# Patient Record
Sex: MALE | Race: WHITE | NOT HISPANIC OR LATINO | Employment: FULL TIME | URBAN - METROPOLITAN AREA
[De-identification: names, ages, dates, MRNs, and addresses within clinical notes are randomized per-mention and may not be internally consistent; named-entity substitution may affect disease eponyms.]

---

## 2019-08-21 PROBLEM — F43.12 CHRONIC POST-TRAUMATIC STRESS DISORDER (PTSD): Status: ACTIVE | Noted: 2019-01-02

## 2019-08-21 PROBLEM — F90.2 ATTENTION DEFICIT HYPERACTIVITY DISORDER (ADHD), COMBINED TYPE: Status: ACTIVE | Noted: 2019-01-02

## 2019-08-21 RX ORDER — PAROXETINE HYDROCHLORIDE 20 MG/1
TABLET, FILM COATED ORAL
COMMUNITY
Start: 2019-01-02 | End: 2019-08-23

## 2019-08-23 ENCOUNTER — OFFICE VISIT (OUTPATIENT)
Dept: PSYCHIATRY | Facility: CLINIC | Age: 34
End: 2019-08-23

## 2019-08-23 DIAGNOSIS — F43.12 CHRONIC POST-TRAUMATIC STRESS DISORDER (PTSD): ICD-10-CM

## 2019-08-23 DIAGNOSIS — F90.2 ATTENTION DEFICIT HYPERACTIVITY DISORDER (ADHD), COMBINED TYPE: Primary | ICD-10-CM

## 2019-08-23 PROCEDURE — 99212 OFFICE O/P EST SF 10 MIN: CPT | Performed by: PSYCHIATRY & NEUROLOGY
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
## 2019-08-23 NOTE — PATIENT INSTRUCTIONS
Attention Deficit Hyperactivity Disorder, Adult  Attention deficit hyperactivity disorder (ADHD) is a mental health disorder that starts during childhood. For many people with ADHD, the disorder continues into adult years. There are many things that you and your health care provider or therapist (mental health professional) can do to manage your symptoms.  What are the causes?  The exact cause of ADHD is not known.  What increases the risk?  You are more likely to develop this condition if:  · You have a family history of ADHD.  · You are male.  · You were born to a mother who smoked or drank alcohol during pregnancy.  · You were exposed to lead poisoning or other toxins in the womb or in early life.  · You were born before 37 weeks of pregnancy (prematurely) or you had a low birth weight.  · You have experienced a brain injury.  What are the signs or symptoms?  Symptoms of this condition depend on the type of ADHD. The two main types are inattentive and hyperactive-impulsive. Some people may have symptoms of both types.  Symptoms of the inattentive type include:  · Difficulty watching, listening, or thinking with focused effort (paying attention).  · Making careless mistakes.  · Not listening.  · Not following instructions.  · Being disorganized.  · Avoiding tasks that require time and attention.  · Losing things.  · Forgetting things.  · Being easily distracted.  Symptoms of the hyperactive-impulsive type include:  · Restlessness.  · Talking too much.  · Interrupting.  · Difficulty with:  ? Sitting still.  ? Staying quiet.  ? Feeling motivated.  ? Relaxing.  ? Waiting in line or waiting for a turn.  How is this diagnosed?  This condition is diagnosed based on your current symptoms and your history of symptoms. The diagnosis can be made by a provider such as a primary care provider, psychiatrist, psychologist, or clinical . The provider may use a symptom checklist or a standardized behavior rating  scale to evaluate your symptoms. He or she may want to talk with family members who have known you for a long time and have observed your behaviors.  There are no lab tests or brain imaging tests that can diagnose ADHD.  How is this treated?  This condition can be treated with medicines and behavior therapy. Medicines may be the best option to reduce impulsive behaviors and improve attention. Your health care provider may recommend:  · Stimulant medicines. These are the most common medicines used for adult ADHD. They affect certain chemicals in the brain (neurotransmitters). These medicines may be long-acting or short-acting. This will determine how often you need to take the medicine.  · A non-stimulant medicine for adult ADHD (atomoxetine). This medicine increases a neurotransmitter called norepinephrine. It may take weeks to months to see effects from this medicine.  Psychotherapy and behavioral management are also important for treating ADHD. Psychotherapy is often used along with medicine. Your health care provider may suggest:  · Cognitive behavioral therapy (CBT). This type of therapy teaches you to replace negative thoughts and actions with positive thoughts and actions. When used as part of ADHD treatment, this therapy may also include:  ? Coping strategies for organization, time management, impulse control, and stress reduction.  ? Mindfulness and meditation training.  · Behavioral management. This may include strategies for organization and time management. You may work with an ADHD  who is specially trained to help people with ADHD to manage and organize activities and to function more effectively.  Follow these instructions at home:  Medicines    · Take over-the-counter and prescription medicines only as told by your health care provider.  · Talk with your health care provider about the possible side effects of your medicine to watch for.  General instructions    · Learn as much as you can about  adult ADHD, and work closely with your health care providers to find the treatments that work best for you.  · Do not use drugs or abuse alcohol. Limit alcohol intake to no more than 1 drink a day for nonpregnant women and 2 drinks a day for men. One drink equals 12 oz of beer, 5 oz of wine, or 1½ oz of hard liquor.  · Follow the same schedule each day. Make sure your schedule includes enough time for you to get plenty of sleep.  · Use reminder devices like notes, calendars, and phone apps to stay on-time and organized.  · Eat a healthy diet. Do not skip meals.  · Exercise regularly. Exercise can help to reduce stress and anxiety.  · Keep all follow-up visits as told by your health care provider and therapist. This is important.  Where to find more information  · A health care provider may be able to recommend resources that are available online or over the phone. You could start with:  ? Attention Deficit Disorder Association (ADDA): www.add.org  ? National [place] [employer] Health (NIMH): www.nimh.nih.gov  Contact a health care provider if:  · Your symptoms are changing, getting worse, or not improving.  · You have side effects from your medicine, such as:  ? Repeated muscle twitches, coughing, or speech outbursts.  ? Sleep problems.  ? Loss of appetite.  ? Depression.  ? New or worsening behavior problems.  ? Dizziness.  ? Unusually fast heartbeat.  ? Stomach pains.  ? Headaches.  · You are struggling with anxiety, depression, or substance abuse.  Get help right away if:  · You have a severe reaction to a medicine.  · You have thoughts of hurting yourself or others.  If you ever feel like you may hurt yourself or others, or have thoughts about taking your own life, get help right away. You can go to the nearest emergency department or call:  · Your local emergency services (911 in the [place]  · A suicide crisis helpline, such as the National Suicide Prevention Lifeline at 1-547.789.2535. This is open 24 hours a  day.  Summary  · ADHD is a mental health disorder that starts during childhood and often continues into adult years.  · The exact cause of ADHD is not known.  · There is no cure for ADHD, but treatment with medicine, therapy, or behavioral training can help you manage your condition.  This information is not intended to replace advice given to you by your health care provider. Make sure you discuss any questions you have with your health care provider.  Document Released: 08/09/2018 Document Revised: 08/09/2018 Document Reviewed: 08/09/2018  [employer] Interactive Patient Education © 2019 [employer]

## 2019-09-26 DIAGNOSIS — F90.2 ATTENTION DEFICIT HYPERACTIVITY DISORDER (ADHD), COMBINED TYPE: ICD-10-CM

## 2019-10-31 DIAGNOSIS — F90.2 ATTENTION DEFICIT HYPERACTIVITY DISORDER (ADHD), COMBINED TYPE: ICD-10-CM

## 2019-11-20 ENCOUNTER — OFFICE VISIT (OUTPATIENT)
Dept: PSYCHIATRY | Facility: CLINIC | Age: 34
End: 2019-11-20

## 2019-11-20 DIAGNOSIS — F90.2 ATTENTION DEFICIT HYPERACTIVITY DISORDER (ADHD), COMBINED TYPE: Primary | ICD-10-CM

## 2019-11-20 DIAGNOSIS — F43.12 CHRONIC POST-TRAUMATIC STRESS DISORDER (PTSD): ICD-10-CM

## 2019-11-20 PROCEDURE — 99212 OFFICE O/P EST SF 10 MIN: CPT | Performed by: PSYCHIATRY & NEUROLOGY
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
## 2019-11-20 NOTE — PATIENT INSTRUCTIONS
Living With Attention Deficit Hyperactivity Disorder  If you have been diagnosed with attention deficit hyperactivity disorder (ADHD), you may be relieved that you now know why you have felt or behaved a certain way. Still, you may feel overwhelmed about the treatment ahead. You may also wonder how to get the support you need and how to deal with the condition day-to-day. With treatment and support, you can live with ADHD and manage your symptoms.  How to manage lifestyle changes  Managing stress  Stress is your body's reaction to life changes and events, both good and bad. To cope with the stress of an ADHD diagnosis, it may help to:  · Learn more about ADHD.  · Exercise regularly. Even a short daily walk can lower stress levels.  · Participate in training or education programs (including social skills training classes) that teach you to deal with symptoms.    Medicines  Your health care provider may suggest certain medicines if he or she feels that they will help to improve your condition. Stimulant medicines are usually prescribed to treat ADHD, and therapy may also be prescribed. It is important to:  · Avoid using alcohol and other substances that may prevent your medicines from working properly (may interact).   · Talk with your pharmacist or health care provider about all the medicines that you take, their possible side effects, and what medicines are safe to take together.  · Make it your goal to take part in all treatment decisions (shared decision-making). Ask about possible side effects of medicines that your health care provider recommends, and tell him or her how you feel about having those side effects. It is best if shared decision-making with your health care provider is part of your total treatment plan.  Relationships  To strengthen your relationships with family members while treating your condition, consider taking part in family therapy. You might also attend self-help groups alone or with a  loved one.  Be honest about how your symptoms affect your relationships. Make an effort to communicate respectfully instead of fighting, and find ways to show others that you care. Psychotherapy may be useful in helping you cope with how ADHD affects your relationships.  How to recognize changes in your condition  The following signs may mean that your treatment is working well and your condition is improving:  · Consistently being on time for appointments.  · Being more organized at home and work.  · Other people noticing improvements in your behavior.  · Achieving goals that you set for yourself.  · Thinking more clearly.  The following signs may mean that your treatment is not working very well:  · Feeling impatience or more confusion.  · Missing, forgetting, or being late for appointments.  · An increasing sense of disorganization and messiness.  · More difficulty in reaching goals that you set for yourself.  · Loved ones becoming angry or frustrated with you.  Where to find support  Talking to others  · Keep emotion out of important discussions and speak in a calm, logical way.  · Listen closely and patiently to your loved ones. Try to understand their point of view, and try to avoid getting defensive.  · Take responsibility for the consequences of your actions.  · Ask that others do not take your behaviors personally.  · Aim to solve problems as they come up, and express your feelings instead of bottling them up.  · Talk openly about what you need from your loved ones and how they can support you.  · Consider going to family therapy sessions or having your family meet with a specialist who deals with ADHD-related behavior problems.  Finances  Not all insurance plans cover mental health care, so it is important to check with your insurance carrier. If paying for co-pays or counseling services is a problem, search for a [facility] or [facility] mental health care center. Public mental health care services may be offered  there at a low cost or no cost when you are not able to see a private health care provider.  If you are taking medicine for ADHD, you may be able to get the generic form, which may be less expensive than brand-name medicine. Some makers of prescription medicines also offer help to patients who cannot afford the medicines that they need.  Follow these instructions at home:  · Take over-the-counter and prescription medicines only as told by your health care provider. Check with your health care provider before taking any new medicines.  · Create structure and an organized atmosphere at home. For example:  ? Make a list of tasks, then rank them from most important to least important. Work on one task at a time until your listed tasks are done.  ? Make a daily schedule and follow it consistently every day.  ? Use an appointment calendar, and check it 2 or 3 times a day to keep on track. Keep it with you when you leave the house.  ? Create spaces where you keep certain things, and always put things back in their places after you use them.  · Keep all follow-up visits as told by your health care provider. This is important.  Questions to ask your health care provider:  · What are the risks and benefits of taking medicines?  · Would I benefit from therapy?  · How often should I follow up with a health care provider?  Contact a health care provider if:  · You have side effects from your medicines, such as:  ? Repeated muscle twitches, coughing, or speech outbursts.  ? Sleep problems.  ? Loss of appetite.  ? Depression.  ? New or worsening behavior problems.  ? Dizziness.  ? Unusually fast heartbeat.  ? Stomach pains.  ? Headaches.  Get help right away if:  · You have a severe reaction to a medicine.  · Your behavior suddenly gets worse.  Summary  · With treatment and support, you can live with ADHD and manage your symptoms.  · The medicines that are most often prescribed for ADHD are stimulants.  · Consider taking part in  family therapy or self-help groups with family members or friends.  · When you talk with friends and family about your ADHD, be patient and communicate openly.  · Take over-the-counter and prescription medicines only as told by your health care provider. Check with your health care provider before taking any new medicines.  This information is not intended to replace advice given to you by your health care provider. Make sure you discuss any questions you have with your health care provider.  Document Released: 04/19/2018 Document Revised: 04/19/2018 Document Reviewed: 04/19/2018  [employer] Interactive Patient Education © 2019 [employer]

## 2020-01-07 DIAGNOSIS — F90.2 ATTENTION DEFICIT HYPERACTIVITY DISORDER (ADHD), COMBINED TYPE: ICD-10-CM

## 2020-02-04 DIAGNOSIS — F90.2 ATTENTION DEFICIT HYPERACTIVITY DISORDER (ADHD), COMBINED TYPE: ICD-10-CM

## 2020-02-20 ENCOUNTER — TELEPHONE (OUTPATIENT)
Dept: PSYCHIATRY | Facility: CLINIC | Age: 35
End: 2020-02-20

## 2020-02-20 DIAGNOSIS — F90.2 ATTENTION DEFICIT HYPERACTIVITY DISORDER (ADHD), COMBINED TYPE: ICD-10-CM

## 2020-02-20 NOTE — TELEPHONE ENCOUNTER
Patient states he went on business trip and was unable to  vyvanse script and is wondering if you could resend being that they ended up putting back script and will not fill. Please advise

## 2020-03-05 ENCOUNTER — OFFICE VISIT (OUTPATIENT)
Dept: PSYCHIATRY | Facility: CLINIC | Age: 35
End: 2020-03-05

## 2020-03-05 DIAGNOSIS — F43.12 CHRONIC POST-TRAUMATIC STRESS DISORDER (PTSD): ICD-10-CM

## 2020-03-05 DIAGNOSIS — F90.2 ATTENTION DEFICIT HYPERACTIVITY DISORDER (ADHD), COMBINED TYPE: Primary | ICD-10-CM

## 2020-03-05 PROCEDURE — 99212 OFFICE O/P EST SF 10 MIN: CPT | Performed by: PSYCHIATRY & NEUROLOGY
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
## 2020-03-05 NOTE — PATIENT INSTRUCTIONS
Attention Deficit Hyperactivity Disorder, Adult  Attention deficit hyperactivity disorder (ADHD) is a mental health disorder that starts during childhood. For many people with ADHD, the disorder continues into adult years. There are many things that you and your health care provider or therapist (mental health professional) can do to manage your symptoms.  What are the causes?  The exact cause of ADHD is not known.  What increases the risk?  You are more likely to develop this condition if:  · You have a family history of ADHD.  · You are male.  · You were born to a mother who smoked or drank alcohol during pregnancy.  · You were exposed to lead poisoning or other toxins in the womb or in early life.  · You were born before 37 weeks of pregnancy (prematurely) or you had a low birth weight.  · You have experienced a brain injury.  What are the signs or symptoms?  Symptoms of this condition depend on the type of ADHD. The two main types are inattentive and hyperactive-impulsive. Some people may have symptoms of both types.  Symptoms of the inattentive type include:  · Difficulty watching, listening, or thinking with focused effort (paying attention).  · Making careless mistakes.  · Not listening.  · Not following instructions.  · Being disorganized.  · Avoiding tasks that require time and attention.  · Losing things.  · Forgetting things.  · Being easily distracted.  Symptoms of the hyperactive-impulsive type include:  · Restlessness.  · Talking too much.  · Interrupting.  · Difficulty with:  ? Sitting still.  ? Staying quiet.  ? Feeling motivated.  ? Relaxing.  ? Waiting in line or waiting for a turn.  How is this diagnosed?  This condition is diagnosed based on your current symptoms and your history of symptoms. The diagnosis can be made by a provider such as a primary care provider, psychiatrist, psychologist, or clinical . The provider may use a symptom checklist or a standardized behavior rating  scale to evaluate your symptoms. He or she may want to talk with family members who have known you for a long time and have observed your behaviors.  There are no lab tests or brain imaging tests that can diagnose ADHD.  How is this treated?  This condition can be treated with medicines and behavior therapy. Medicines may be the best option to reduce impulsive behaviors and improve attention. Your health care provider may recommend:  · Stimulant medicines. These are the most common medicines used for adult ADHD. They affect certain chemicals in the brain (neurotransmitters). These medicines may be long-acting or short-acting. This will determine how often you need to take the medicine.  · A non-stimulant medicine for adult ADHD (atomoxetine). This medicine increases a neurotransmitter called norepinephrine. It may take weeks to months to see effects from this medicine.  Psychotherapy and behavioral management are also important for treating ADHD. Psychotherapy is often used along with medicine. Your health care provider may suggest:  · Cognitive behavioral therapy (CBT). This type of therapy teaches you to replace negative thoughts and actions with positive thoughts and actions. When used as part of ADHD treatment, this therapy may also include:  ? Coping strategies for organization, time management, impulse control, and stress reduction.  ? Mindfulness and meditation training.  · Behavioral management. This may include strategies for organization and time management. You may work with an ADHD  who is specially trained to help people with ADHD to manage and organize activities and to function more effectively.  Follow these instructions at home:  Medicines    · Take over-the-counter and prescription medicines only as told by your health care provider.  · Talk with your health care provider about the possible side effects of your medicine to watch for.  General instructions    · Learn as much as you can about  adult ADHD, and work closely with your health care providers to find the treatments that work best for you.  · Do not use drugs or abuse alcohol. Limit alcohol intake to no more than 1 drink a day for nonpregnant women and 2 drinks a day for men. One drink equals 12 oz of beer, 5 oz of wine, or 1½ oz of hard liquor.  · Follow the same schedule each day. Make sure your schedule includes enough time for you to get plenty of sleep.  · Use reminder devices like notes, calendars, and phone apps to stay on-time and organized.  · Eat a healthy diet. Do not skip meals.  · Exercise regularly. Exercise can help to reduce stress and anxiety.  · Keep all follow-up visits as told by your health care provider and therapist. This is important.  Where to find more information  · A health care provider may be able to recommend resources that are available online or over the phone. You could start with:  ? Attention Deficit Disorder Association (ADDA): www.add.org  ? National [place] of Mental Health (NIMH): www.nimh.nih.gov  Contact a health care provider if:  · Your symptoms are changing, getting worse, or not improving.  · You have side effects from your medicine, such as:  ? Repeated muscle twitches, coughing, or speech outbursts.  ? Sleep problems.  ? Loss of appetite.  ? Depression.  ? New or worsening behavior problems.  ? Dizziness.  ? Unusually fast heartbeat.  ? Stomach pains.  ? Headaches.  · You are struggling with anxiety, depression, or substance abuse.  Get help right away if:  · You have a severe reaction to a medicine.  · You have thoughts of hurting yourself or others.  If you ever feel like you may hurt yourself or others, or have thoughts about taking your own life, get help right away. You can go to the nearest emergency department or call:  · Your local emergency services (911 in the [place]  · A suicide crisis helpline, such as the National Suicide Prevention Lifeline at 1-125.349.1302. This is open 24 hours a  day.  Summary  · ADHD is a mental health disorder that starts during childhood and often continues into adult years.  · The exact cause of ADHD is not known.  · There is no cure for ADHD, but treatment with medicine, therapy, or behavioral training can help you manage your condition.  This information is not intended to replace advice given to you by your health care provider. Make sure you discuss any questions you have with your health care provider.  Document Released: 08/09/2018 Document Revised: 08/09/2018 Document Reviewed: 08/09/2018  [employer] Interactive Patient Education © 2020 [employer]

## 2020-04-22 DIAGNOSIS — F90.2 ATTENTION DEFICIT HYPERACTIVITY DISORDER (ADHD), COMBINED TYPE: ICD-10-CM

## 2020-04-23 NOTE — TELEPHONE ENCOUNTER
4/22/20-- PT REQUEST VYVANCE REFILL 60MG ONE A DAY-- [facility]  
4/23/20- dr stollcribed meds/lks  
Less than 6 months (influenza vaccine not applicable for this age group)

## 2020-05-26 DIAGNOSIS — F90.2 ATTENTION DEFICIT HYPERACTIVITY DISORDER (ADHD), COMBINED TYPE: ICD-10-CM

## 2020-06-23 DIAGNOSIS — F90.2 ATTENTION DEFICIT HYPERACTIVITY DISORDER (ADHD), COMBINED TYPE: ICD-10-CM

## 2020-07-10 ENCOUNTER — OFFICE VISIT (OUTPATIENT)
Dept: PSYCHIATRY | Facility: CLINIC | Age: 35
End: 2020-07-10

## 2020-07-10 DIAGNOSIS — F90.2 ATTENTION DEFICIT HYPERACTIVITY DISORDER (ADHD), COMBINED TYPE: Primary | ICD-10-CM

## 2020-07-10 DIAGNOSIS — F43.12 CHRONIC POST-TRAUMATIC STRESS DISORDER (PTSD): ICD-10-CM

## 2020-07-10 PROCEDURE — 99441 PR PHYS/QHP TELEPHONE EVALUATION 5-10 MIN: CPT | Performed by: PSYCHIATRY & NEUROLOGY

## 2020-07-10 NOTE — PATIENT INSTRUCTIONS
Attention Deficit Hyperactivity Disorder, Adult  Attention deficit hyperactivity disorder (ADHD) is a mental health disorder that starts during childhood. For many people with ADHD, the disorder continues into adult years. There are many things that you and your health care provider or therapist (mental health professional) can do to manage your symptoms.  What are the causes?  The exact cause of ADHD is not known.  What increases the risk?  You are more likely to develop this condition if:  · You have a family history of ADHD.  · You are male.  · You were born to a mother who smoked or drank alcohol during pregnancy.  · You were exposed to lead poisoning or other toxins in the womb or in early life.  · You were born before 37 weeks of pregnancy (prematurely) or you had a low birth weight.  · You have experienced a brain injury.  What are the signs or symptoms?  Symptoms of this condition depend on the type of ADHD. The two main types are inattentive and hyperactive-impulsive. Some people may have symptoms of both types.  Symptoms of the inattentive type include:  · Difficulty watching, listening, or thinking with focused effort (paying attention).  · Making careless mistakes.  · Not listening.  · Not following instructions.  · Being disorganized.  · Avoiding tasks that require time and attention.  · Losing things.  · Forgetting things.  · Being easily distracted.  Symptoms of the hyperactive-impulsive type include:  · Restlessness.  · Talking too much.  · Interrupting.  · Difficulty with:  ? Sitting still.  ? Staying quiet.  ? Feeling motivated.  ? Relaxing.  ? Waiting in line or waiting for a turn.  How is this diagnosed?  This condition is diagnosed based on your current symptoms and your history of symptoms. The diagnosis can be made by a provider such as a primary care provider, psychiatrist, psychologist, or clinical . The provider may use a symptom checklist or a standardized behavior rating  scale to evaluate your symptoms. He or she may want to talk with family members who have known you for a long time and have observed your behaviors.  There are no lab tests or brain imaging tests that can diagnose ADHD.  How is this treated?  This condition can be treated with medicines and behavior therapy. Medicines may be the best option to reduce impulsive behaviors and improve attention. Your health care provider may recommend:  · Stimulant medicines. These are the most common medicines used for adult ADHD. They affect certain chemicals in the brain (neurotransmitters). These medicines may be long-acting or short-acting. This will determine how often you need to take the medicine.  · A non-stimulant medicine for adult ADHD (atomoxetine). This medicine increases a neurotransmitter called norepinephrine. It may take weeks to months to see effects from this medicine.  Psychotherapy and behavioral management are also important for treating ADHD. Psychotherapy is often used along with medicine. Your health care provider may suggest:  · Cognitive behavioral therapy (CBT). This type of therapy teaches you to replace negative thoughts and actions with positive thoughts and actions. When used as part of ADHD treatment, this therapy may also include:  ? Coping strategies for organization, time management, impulse control, and stress reduction.  ? Mindfulness and meditation training.  · Behavioral management. This may include strategies for organization and time management. You may work with an ADHD  who is specially trained to help people with ADHD to manage and organize activities and to function more effectively.  Follow these instructions at home:  Medicines    · Take over-the-counter and prescription medicines only as told by your health care provider.  · Talk with your health care provider about the possible side effects of your medicine to watch for.  General instructions    · Learn as much as you can about  adult ADHD, and work closely with your health care providers to find the treatments that work best for you.  · Do not use drugs or abuse alcohol. Limit alcohol intake to no more than 1 drink a day for nonpregnant women and 2 drinks a day for men. One drink equals 12 oz of beer, 5 oz of wine, or 1½ oz of hard liquor.  · Follow the same schedule each day. Make sure your schedule includes enough time for you to get plenty of sleep.  · Use reminder devices like notes, calendars, and phone apps to stay on-time and organized.  · Eat a healthy diet. Do not skip meals.  · Exercise regularly. Exercise can help to reduce stress and anxiety.  · Keep all follow-up visits as told by your health care provider and therapist. This is important.  Where to find more information  · A health care provider may be able to recommend resources that are available online or over the phone. You could start with:  ? Attention Deficit Disorder Association (ADDA): www.add.org  ? [facility] [place] [facility] (NIMH): www.nimh.nih.gov  Contact a health care provider if:  · Your symptoms are changing, getting worse, or not improving.  · You have side effects from your medicine, such as:  ? Repeated muscle twitches, coughing, or speech outbursts.  ? Sleep problems.  ? Loss of appetite.  ? Depression.  ? New or worsening behavior problems.  ? Dizziness.  ? Unusually fast heartbeat.  ? Stomach pains.  ? Headaches.  · You are struggling with anxiety, depression, or substance abuse.  Get help right away if:  · You have a severe reaction to a medicine.  · You have thoughts of hurting yourself or others.  If you ever feel like you may hurt yourself or others, or have thoughts about taking your own life, get help right away. You can go to the nearest emergency department or call:  · Your local emergency services (911 in the [place]  · A suicide crisis helpline, such as the National Suicide Prevention Lifeline at 1-181.612.9320. This is open 24 hours a  day.  Summary  · ADHD is a mental health disorder that starts during childhood and often continues into adult years.  · The exact cause of ADHD is not known.  · There is no cure for ADHD, but treatment with medicine, therapy, or behavioral training can help you manage your condition.  This information is not intended to replace advice given to you by your health care provider. Make sure you discuss any questions you have with your health care provider.  Document Released: 08/09/2018 Document Revised: 11/30/2018 Document Reviewed: 08/09/2018  Elsevier Patient Education © 2020 [employer]
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
## 2020-07-20 DIAGNOSIS — F90.2 ATTENTION DEFICIT HYPERACTIVITY DISORDER (ADHD), COMBINED TYPE: ICD-10-CM

## 2020-08-28 DIAGNOSIS — F90.2 ATTENTION DEFICIT HYPERACTIVITY DISORDER (ADHD), COMBINED TYPE: ICD-10-CM

## 2020-09-29 DIAGNOSIS — F90.2 ATTENTION DEFICIT HYPERACTIVITY DISORDER (ADHD), COMBINED TYPE: ICD-10-CM

## 2020-11-05 ENCOUNTER — OFFICE VISIT (OUTPATIENT)
Dept: PSYCHIATRY | Facility: CLINIC | Age: 35
End: 2020-11-05

## 2020-11-05 DIAGNOSIS — F43.12 CHRONIC POST-TRAUMATIC STRESS DISORDER (PTSD): ICD-10-CM

## 2020-11-05 DIAGNOSIS — F90.2 ATTENTION DEFICIT HYPERACTIVITY DISORDER (ADHD), COMBINED TYPE: Primary | ICD-10-CM

## 2020-11-05 PROCEDURE — 99441 PR PHYS/QHP TELEPHONE EVALUATION 5-10 MIN: CPT | Performed by: PSYCHIATRY & NEUROLOGY
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
## 2020-11-05 NOTE — PATIENT INSTRUCTIONS
Attention Deficit Hyperactivity Disorder, Adult  Attention deficit hyperactivity disorder (ADHD) is a mental health disorder that starts during childhood (neurodevelopmental disorder). For many people with ADHD, the disorder continues into the adult years. Treatment can help you manage your symptoms.  What are the causes?  The exact cause of ADHD is not known. Most experts believe genetics and environmental factors contribute to ADHD.  What increases the risk?  The following factors may make you more likely to develop this condition:  · Having a family history of ADHD.  · Being male.  · Being born to a mother who smoked or drank alcohol during pregnancy.  · Being exposed to lead or other toxins in the womb or early in life.  · Being born before 37 weeks of pregnancy (prematurely) or at a low birth weight.  · Having experienced a brain injury.  What are the signs or symptoms?  Symptoms of this condition depend on the type of ADHD. The two main types are inattentive and hyperactive-impulsive. Some people may have symptoms of both types.  Symptoms of the inattentive type include:  · Difficulty paying attention.  · Making careless mistakes.  · Not following instructions.  · Being disorganized.  · Avoiding tasks that require time and attention.  · Losing and forgetting things.  · Being easily distracted.  Symptoms of the hyperactive-impulsive type include:  · Restlessness.  · Talking too much.  · Interrupting.  · Difficulty with:  ? Sitting still.  ? Feeling motivated.  ? Relaxing.  ? Waiting in line or waiting for a turn.  In adults, this condition may lead to certain problems, such as:  · Keeping jobs.  · Performing tasks at work.  · Having stable relationships.  · Being on time or keeping to a schedule.  How is this diagnosed?  This condition is diagnosed based on your current symptoms and your history of symptoms. The diagnosis can be made by a health care provider such as a primary care provider or a mental health  care specialist.  Your health care provider may use a symptom checklist or a behavior rating scale to evaluate your symptoms. He or she may also want to talk with people who have observed your behaviors throughout your life.  How is this treated?  This condition can be treated with medicines and behavior therapy. Medicines may be the best option to reduce impulsive behaviors and improve attention. Your health care provider may recommend:  · Stimulant medicines. These are the most common medicines used for adult ADHD. They affect certain chemicals in the brain (neurotransmitters) and improve your ability to control your symptoms.  · A non-stimulant medicine for adult ADHD (atomoxetine). This medicine increases a neurotransmitter called norepinephrine. It may take weeks to months to see effects from this medicine.  Counseling and behavioral management are also important for treating ADHD. Counseling is often used along with medicine. Your health care provider may suggest:  · Cognitive behavioral therapy (CBT). This type of therapy teaches you to replace negative thoughts and actions with positive thoughts and actions. When used as part of ADHD treatment, this therapy may also include:  ? Coping strategies for organization, time management, impulse control, and stress reduction.  ? Mindfulness and meditation training.  · Behavioral management. You may work with a  who is specially trained to help people with ADHD manage and organize activities and function more effectively.  Follow these instructions at home:  Medicines    · Take over-the-counter and prescription medicines only as told by your health care provider.  · Talk with your health care provider about the possible side effects of your medicines and how to manage them.  Lifestyle    · Do not use drugs.  · Do not drink alcohol if:  ? Your health care provider tells you not to drink.  ? You are pregnant, may be pregnant, or are planning to become  pregnant.  · If you drink alcohol:  ? Limit how much you use to:  § 0-1 drink a day for women.  § 0-2 drinks a day for men.  ? Be aware of how much alcohol is in your drink. In the [place] one drink equals one 12 oz bottle of beer (355 mL), one 5 oz glass of wine (148 mL), or one 1½ oz glass of hard liquor (44 mL).  · Get enough sleep.  · Eat a healthy diet.  · Exercise regularly. Exercise can help to reduce stress and anxiety.  General instructions  · Learn as much as you can about adult ADHD, and work closely with your health care providers to find the treatments that work best for you.  · Follow the same schedule each day.  · Use reminder devices like notes, calendars, and phone apps to stay on time and organized.  · Keep all follow-up visits as told by your health care provider and therapist. This is important.  Where to find more information  A health care provider may be able to recommend resources that are available online or over the phone. You could start with:  · Attention Deficit Disorder Association (ADDA): www.add.org  · [place] of Mental Health (NIM): www.nimh.nih.gov  Contact a health care provider if:  · Your symptoms continue to cause problems.  · You have side effects from your medicine, such as:  ? Repeated muscle twitches, coughing, or speech outbursts.  ? Sleep problems.  ? Loss of appetite.  ? Dizziness.  ? Unusually fast heartbeat.  ? Stomach pains.  ? Headaches.  · You are struggling with anxiety, depression, or substance abuse.  Get help right away if you:  · Have a severe reaction to a medicine.  If you ever feel like you may hurt yourself or others, or have thoughts about taking your own life, get help right away. You can go to the nearest emergency department or call:  · Your local emergency services (911 in the [place]  · A suicide crisis helpline, such as the National Suicide Prevention Lifeline at 1-467.345.1996. This is open 24 hours a day.  Summary  · ADHD is a mental health  disorder that starts during childhood (neurodevelopmental disorder) and often continues into the adult years.  · The exact cause of ADHD is not known. Most experts believe genetics and environmental factors contribute to ADHD.  · There is no cure for ADHD, but treatment with medicine, cognitive behavioral therapy, or behavioral management can help you manage your condition.  This information is not intended to replace advice given to you by your health care provider. Make sure you discuss any questions you have with your health care provider.  Document Released: 08/09/2018 Document Revised: 05/11/2020 Document Reviewed: 05/11/2020  Elsevier Patient Education © 2020 [employer]

## 2020-12-11 DIAGNOSIS — F90.2 ATTENTION DEFICIT HYPERACTIVITY DISORDER (ADHD), COMBINED TYPE: ICD-10-CM

## 2021-01-12 DIAGNOSIS — F90.2 ATTENTION DEFICIT HYPERACTIVITY DISORDER (ADHD), COMBINED TYPE: ICD-10-CM

## 2021-02-10 DIAGNOSIS — F90.2 ATTENTION DEFICIT HYPERACTIVITY DISORDER (ADHD), COMBINED TYPE: ICD-10-CM

## 2021-02-24 ENCOUNTER — OFFICE VISIT (OUTPATIENT)
Dept: PSYCHIATRY | Facility: CLINIC | Age: 36
End: 2021-02-24

## 2021-02-24 DIAGNOSIS — F43.12 CHRONIC POST-TRAUMATIC STRESS DISORDER (PTSD): Chronic | ICD-10-CM

## 2021-02-24 DIAGNOSIS — Z79.899 ENCOUNTER FOR LONG-TERM (CURRENT) USE OF OTHER MEDICATIONS: ICD-10-CM

## 2021-02-24 DIAGNOSIS — F90.2 ATTENTION DEFICIT HYPERACTIVITY DISORDER (ADHD), COMBINED TYPE: Primary | Chronic | ICD-10-CM

## 2021-02-24 PROCEDURE — 99214 OFFICE O/P EST MOD 30 MIN: CPT | Performed by: PSYCHIATRY & NEUROLOGY

## 2021-02-24 NOTE — PATIENT INSTRUCTIONS
Attention Deficit Hyperactivity Disorder, Adult  Attention deficit hyperactivity disorder (ADHD) is a mental health disorder that starts during childhood (neurodevelopmental disorder). For many people with ADHD, the disorder continues into the adult years. Treatment can help you manage your symptoms.  What are the causes?  The exact cause of ADHD is not known. Most experts believe genetics and environmental factors contribute to ADHD.  What increases the risk?  The following factors may make you more likely to develop this condition:  · Having a family history of ADHD.  · Being male.  · Being born to a mother who smoked or drank alcohol during pregnancy.  · Being exposed to lead or other toxins in the womb or early in life.  · Being born before 37 weeks of pregnancy (prematurely) or at a low birth weight.  · Having experienced a brain injury.  What are the signs or symptoms?  Symptoms of this condition depend on the type of ADHD. The two main types are inattentive and hyperactive-impulsive. Some people may have symptoms of both types.  Symptoms of the inattentive type include:  · Difficulty paying attention.  · Making careless mistakes.  · Not following instructions.  · Being disorganized.  · Avoiding tasks that require time and attention.  · Losing and forgetting things.  · Being easily distracted.  Symptoms of the hyperactive-impulsive type include:  · Restlessness.  · Talking too much.  · Interrupting.  · Difficulty with:  ? Sitting still.  ? Feeling motivated.  ? Relaxing.  ? Waiting in line or waiting for a turn.  In adults, this condition may lead to certain problems, such as:  · Keeping jobs.  · Performing tasks at work.  · Having stable relationships.  · Being on time or keeping to a schedule.  How is this diagnosed?  This condition is diagnosed based on your current symptoms and your history of symptoms. The diagnosis can be made by a health care provider such as a primary care provider or a mental health  care specialist.  Your health care provider may use a symptom checklist or a behavior rating scale to evaluate your symptoms. He or she may also want to talk with people who have observed your behaviors throughout your life.  How is this treated?  This condition can be treated with medicines and behavior therapy. Medicines may be the best option to reduce impulsive behaviors and improve attention. Your health care provider may recommend:  · Stimulant medicines. These are the most common medicines used for adult ADHD. They affect certain chemicals in the brain (neurotransmitters) and improve your ability to control your symptoms.  · A non-stimulant medicine for adult ADHD (atomoxetine). This medicine increases a neurotransmitter called norepinephrine. It may take weeks to months to see effects from this medicine.  Counseling and behavioral management are also important for treating ADHD. Counseling is often used along with medicine. Your health care provider may suggest:  · Cognitive behavioral therapy (CBT). This type of therapy teaches you to replace negative thoughts and actions with positive thoughts and actions. When used as part of ADHD treatment, this therapy may also include:  ? Coping strategies for organization, time management, impulse control, and stress reduction.  ? Mindfulness and meditation training.  · Behavioral management. You may work with a  who is specially trained to help people with ADHD manage and organize activities and function more effectively.  Follow these instructions at home:  Medicines    · Take over-the-counter and prescription medicines only as told by your health care provider.  · Talk with your health care provider about the possible side effects of your medicines and how to manage them.  Lifestyle    · Do not use drugs.  · Do not drink alcohol if:  ? Your health care provider tells you not to drink.  ? You are pregnant, may be pregnant, or are planning to become  pregnant.  · If you drink alcohol:  ? Limit how much you use to:  § 0-1 drink a day for women.  § 0-2 drinks a day for men.  ? Be aware of how much alcohol is in your drink. In the [place] one drink equals one 12 oz bottle of beer (355 mL), one 5 oz glass of wine (148 mL), or one 1½ oz glass of hard liquor (44 mL).  · Get enough sleep.  · Eat a healthy diet.  · Exercise regularly. Exercise can help to reduce stress and anxiety.  General instructions  · Learn as much as you can about adult ADHD, and work closely with your health care providers to find the treatments that work best for you.  · Follow the same schedule each day.  · Use reminder devices like notes, calendars, and phone apps to stay on time and organized.  · Keep all follow-up visits as told by your health care provider and therapist. This is important.  Where to find more information  A health care provider may be able to recommend resources that are available online or over the phone. You could start with:  · Attention Deficit Disorder Association (ADDA): www.add.org  · National [place] of Mental Health (NIM): www.nimh.nih.gov  Contact a health care provider if:  · Your symptoms continue to cause problems.  · You have side effects from your medicine, such as:  ? Repeated muscle twitches, coughing, or speech outbursts.  ? Sleep problems.  ? Loss of appetite.  ? Dizziness.  ? Unusually fast heartbeat.  ? Stomach pains.  ? Headaches.  · You are struggling with anxiety, depression, or substance abuse.  Get help right away if you:  · Have a severe reaction to a medicine.  If you ever feel like you may hurt yourself or others, or have thoughts about taking your own life, get help right away. You can go to the nearest emergency department or call:  · Your local emergency services (911 in the [place]  · A suicide crisis helpline, such as the National Suicide Prevention Lifeline at 1-196.397.5458. This is open 24 hours a day.  Summary  · ADHD is a mental health  disorder that starts during childhood (neurodevelopmental disorder) and often continues into the adult years.  · The exact cause of ADHD is not known. Most experts believe genetics and environmental factors contribute to ADHD.  · There is no cure for ADHD, but treatment with medicine, cognitive behavioral therapy, or behavioral management can help you manage your condition.  This information is not intended to replace advice given to you by your health care provider. Make sure you discuss any questions you have with your health care provider.  Document Revised: 05/11/2020 Document Reviewed: 05/11/2020  Elsevier Patient Education © 2020 [employer] Inc.
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
## 2021-03-17 DIAGNOSIS — F90.2 ATTENTION DEFICIT HYPERACTIVITY DISORDER (ADHD), COMBINED TYPE: ICD-10-CM

## 2021-04-13 DIAGNOSIS — F90.2 ATTENTION DEFICIT HYPERACTIVITY DISORDER (ADHD), COMBINED TYPE: ICD-10-CM

## 2021-04-16 ENCOUNTER — BULK ORDERING (OUTPATIENT)
Dept: CASE MANAGEMENT | Facility: OTHER | Age: 36
End: 2021-04-16

## 2021-04-16 DIAGNOSIS — Z23 IMMUNIZATION DUE: ICD-10-CM

## 2021-05-18 DIAGNOSIS — F90.2 ATTENTION DEFICIT HYPERACTIVITY DISORDER (ADHD), COMBINED TYPE: ICD-10-CM

## 2021-06-16 DIAGNOSIS — F90.2 ATTENTION DEFICIT HYPERACTIVITY DISORDER (ADHD), COMBINED TYPE: ICD-10-CM

## 2021-07-13 ENCOUNTER — TELEPHONE (OUTPATIENT)
Dept: PSYCHIATRY | Facility: CLINIC | Age: 36
End: 2021-07-13

## 2021-07-13 NOTE — TELEPHONE ENCOUNTER
Pt would like to do a video visit or phone visit if possible. He has started a new job and cannot make it in person today

## 2021-07-22 DIAGNOSIS — F90.2 ATTENTION DEFICIT HYPERACTIVITY DISORDER (ADHD), COMBINED TYPE: ICD-10-CM

## 2021-08-19 DIAGNOSIS — F90.2 ATTENTION DEFICIT HYPERACTIVITY DISORDER (ADHD), COMBINED TYPE: ICD-10-CM

## 2021-09-20 DIAGNOSIS — F90.2 ATTENTION DEFICIT HYPERACTIVITY DISORDER (ADHD), COMBINED TYPE: ICD-10-CM

## 2021-09-21 ENCOUNTER — OFFICE VISIT (OUTPATIENT)
Dept: PSYCHIATRY | Facility: CLINIC | Age: 36
End: 2021-09-21

## 2021-09-21 DIAGNOSIS — F43.12 CHRONIC POST-TRAUMATIC STRESS DISORDER (PTSD): Chronic | ICD-10-CM

## 2021-09-21 DIAGNOSIS — Z79.899 ENCOUNTER FOR LONG-TERM (CURRENT) USE OF OTHER MEDICATIONS: ICD-10-CM

## 2021-09-21 DIAGNOSIS — F90.2 ATTENTION DEFICIT HYPERACTIVITY DISORDER (ADHD), COMBINED TYPE: Primary | Chronic | ICD-10-CM

## 2021-09-21 PROCEDURE — 99214 OFFICE O/P EST MOD 30 MIN: CPT | Performed by: PSYCHIATRY & NEUROLOGY

## 2021-09-21 RX ORDER — DEXTROAMPHETAMINE SULFATE 10 MG/1
TABLET ORAL
Qty: 30 TABLET | Refills: 0 | Status: SHIPPED | OUTPATIENT
Start: 2021-09-21 | End: 2022-01-21 | Stop reason: SINTOL

## 2021-09-21 NOTE — PATIENT INSTRUCTIONS
Attention Deficit Hyperactivity Disorder, Adult  Attention deficit hyperactivity disorder (ADHD) is a mental health disorder that starts during childhood (neurodevelopmental disorder). For many people with ADHD, the disorder continues into the adult years. Treatment can help you manage your symptoms.  What are the causes?  The exact cause of ADHD is not known. Most experts believe genetics and environmental factors contribute to ADHD.  What increases the risk?  The following factors may make you more likely to develop this condition:  · Having a family history of ADHD.  · Being male.  · Being born to a mother who smoked or drank alcohol during pregnancy.  · Being exposed to lead or other toxins in the womb or early in life.  · Being born before 37 weeks of pregnancy (prematurely) or at a low birth weight.  · Having experienced a brain injury.  What are the signs or symptoms?  Symptoms of this condition depend on the type of ADHD. The two main types are inattentive and hyperactive-impulsive. Some people may have symptoms of both types.  Symptoms of the inattentive type include:  · Difficulty paying attention.  · Making careless mistakes.  · Not following instructions.  · Being disorganized.  · Avoiding tasks that require time and attention.  · Losing and forgetting things.  · Being easily distracted.  Symptoms of the hyperactive-impulsive type include:  · Restlessness.  · Talking too much.  · Interrupting.  · Difficulty with:  ? Sitting still.  ? Feeling motivated.  ? Relaxing.  ? Waiting in line or waiting for a turn.  In adults, this condition may lead to certain problems, such as:  · Keeping jobs.  · Performing tasks at work.  · Having stable relationships.  · Being on time or keeping to a schedule.  How is this diagnosed?  This condition is diagnosed based on your current symptoms and your history of symptoms. The diagnosis can be made by a health care provider such as a primary care provider or a mental health  care specialist.  Your health care provider may use a symptom checklist or a behavior rating scale to evaluate your symptoms. He or she may also want to talk with people who have observed your behaviors throughout your life.  How is this treated?  This condition can be treated with medicines and behavior therapy. Medicines may be the best option to reduce impulsive behaviors and improve attention. Your health care provider may recommend:  · Stimulant medicines. These are the most common medicines used for adult ADHD. They affect certain chemicals in the brain (neurotransmitters) and improve your ability to control your symptoms.  · A non-stimulant medicine for adult ADHD (atomoxetine). This medicine increases a neurotransmitter called norepinephrine. It may take weeks to months to see effects from this medicine.  Counseling and behavioral management are also important for treating ADHD. Counseling is often used along with medicine. Your health care provider may suggest:  · Cognitive behavioral therapy (CBT). This type of therapy teaches you to replace negative thoughts and actions with positive thoughts and actions. When used as part of ADHD treatment, this therapy may also include:  ? Coping strategies for organization, time management, impulse control, and stress reduction.  ? Mindfulness and meditation training.  · Behavioral management. You may work with a  who is specially trained to help people with ADHD manage and organize activities and function more effectively.  Follow these instructions at home:  Medicines    · Take over-the-counter and prescription medicines only as told by your health care provider.  · Talk with your health care provider about the possible side effects of your medicines and how to manage them.    Lifestyle    · Do not use drugs.  · Do not drink alcohol if:  ? Your health care provider tells you not to drink.  ? You are pregnant, may be pregnant, or are planning to become  pregnant.  · If you drink alcohol:  ? Limit how much you use to:  § 0-1 drink a day for women.  § 0-2 drinks a day for men.  ? Be aware of how much alcohol is in your drink. In the [place] one drink equals one 12 oz bottle of beer (355 mL), one 5 oz glass of wine (148 mL), or one 1½ oz glass of hard liquor (44 mL).  · Get enough sleep.  · Eat a healthy diet.  · Exercise regularly. Exercise can help to reduce stress and anxiety.    General instructions  · Learn as much as you can about adult ADHD, and work closely with your health care providers to find the treatments that work best for you.  · Follow the same schedule each day.  · Use reminder devices like notes, calendars, and phone apps to stay on time and organized.  · Keep all follow-up visits as told by your health care provider and therapist. This is important.  Where to find more information  A health care provider may be able to recommend resources that are available online or over the phone. You could start with:  · Attention Deficit Disorder Association (ADDA): www.add.org  · National [place] of Mental Health (NIM): www.nimh.nih.gov  Contact a health care provider if:  · Your symptoms continue to cause problems.  · You have side effects from your medicine, such as:  ? Repeated muscle twitches, coughing, or speech outbursts.  ? Sleep problems.  ? Loss of appetite.  ? Dizziness.  ? Unusually fast heartbeat.  ? Stomach pains.  ? Headaches.  · You are struggling with anxiety, depression, or substance abuse.  Get help right away if you:  · Have a severe reaction to a medicine.  If you ever feel like you may hurt yourself or others, or have thoughts about taking your own life, get help right away. You can go to the nearest emergency department or call:  · Your local emergency services (911 in the [place]  · A suicide crisis helpline, such as the National Suicide Prevention Lifeline at 1-781.656.7166. This is open 24 hours a day.  Summary  · ADHD is a mental  health disorder that starts during childhood (neurodevelopmental disorder) and often continues into the adult years.  · The exact cause of ADHD is not known. Most experts believe genetics and environmental factors contribute to ADHD.  · There is no cure for ADHD, but treatment with medicine, cognitive behavioral therapy, or behavioral management can help you manage your condition.  This information is not intended to replace advice given to you by your health care provider. Make sure you discuss any questions you have with your health care provider.  Document Revised: 05/11/2020 Document Reviewed: 05/11/2020  [employer] Patient Education © 2021 [employer]
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
## 2021-10-19 DIAGNOSIS — F90.2 ATTENTION DEFICIT HYPERACTIVITY DISORDER (ADHD), COMBINED TYPE: Chronic | ICD-10-CM

## 2021-10-19 NOTE — TELEPHONE ENCOUNTER
Pt wanted you to know that he took dextroamphetamines for 3 weeks, but it didn't help with anything, so he quit taking it.  He is open to any other suggestions you have.  He says Vyvanse is working well.

## 2021-10-19 NOTE — TELEPHONE ENCOUNTER
Rx Refill Note  Requested Prescriptions     Pending Prescriptions Disp Refills   • lisdexamfetamine (Vyvanse) 60 MG capsule 30 capsule 0     Sig: Take 1 capsule by mouth Every Morning      Last office visit with prescribing clinician: 9/21/2021      Next office visit with prescribing clinician: 1/21/2022   Office Visit with Estrellita Lieberman MD (09/21/2021)       SCANNED - LABS (09/21/2021)      Steph Aguilar MA  10/19/21, 14:52 EDT     No pt found on inspect, but     Spenser needed.

## 2021-11-18 DIAGNOSIS — F90.2 ATTENTION DEFICIT HYPERACTIVITY DISORDER (ADHD), COMBINED TYPE: Chronic | ICD-10-CM

## 2021-11-18 NOTE — TELEPHONE ENCOUNTER
Rx Refill Note  Requested Prescriptions     Pending Prescriptions Disp Refills   • lisdexamfetamine (Vyvanse) 60 MG capsule 30 capsule 0     Sig: Take 1 capsule by mouth Every Morning      Last office visit with prescribing clinician: 9/21/2021      Next office visit with prescribing clinician: 1/21/2022   Office Visit with Estrellita Lieberman MD (09/21/2021)       SCANNED - LABS (09/21/2021)      Kiki Castro MA  11/18/21, 13:49 EST   NO INSPECT needs Sp[facility]er

## 2021-12-16 DIAGNOSIS — F90.2 ATTENTION DEFICIT HYPERACTIVITY DISORDER (ADHD), COMBINED TYPE: Chronic | ICD-10-CM

## 2021-12-16 NOTE — TELEPHONE ENCOUNTER
Incoming Refill Request      Medication requested (name and dose): Vyvanse) 60 MG capsule    Pharmacy where request should be sent: [facility]    Additional details provided by patient: none    Best call back number: [phone]    Does the patient have less than a 3 day supply:  [] Yes  [x] No    Sofie Godfrey  12/16/21, 16:08 EST

## 2022-01-17 DIAGNOSIS — F90.2 ATTENTION DEFICIT HYPERACTIVITY DISORDER (ADHD), COMBINED TYPE: Chronic | ICD-10-CM

## 2022-01-17 NOTE — TELEPHONE ENCOUNTER
Rx Refill Note  Requested Prescriptions      No prescriptions requested or ordered in this encounter      Last office visit with prescribing clinician: 9/21/2021      Next office visit with prescribing clinician: 1/21/2022   Office Visit with Estrellita Lieberman MD (09/21/2021)       SCANNED - LABS (09/21/2021)      Kiki Castro MA  01/17/22, 11:14 EST     NO inspect- needs Spenser

## 2022-01-21 ENCOUNTER — OFFICE VISIT (OUTPATIENT)
Dept: PSYCHIATRY | Facility: CLINIC | Age: 37
End: 2022-01-21

## 2022-01-21 DIAGNOSIS — F90.2 ATTENTION DEFICIT HYPERACTIVITY DISORDER (ADHD), COMBINED TYPE: Primary | Chronic | ICD-10-CM

## 2022-01-21 DIAGNOSIS — F43.12 CHRONIC POST-TRAUMATIC STRESS DISORDER (PTSD): Chronic | ICD-10-CM

## 2022-01-21 PROCEDURE — 99214 OFFICE O/P EST MOD 30 MIN: CPT | Performed by: PSYCHIATRY & NEUROLOGY
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
## 2022-01-21 NOTE — PATIENT INSTRUCTIONS
Living With Attention Deficit Hyperactivity Disorder  If you have been diagnosed with attention deficit hyperactivity disorder (ADHD), you may be relieved that you now know why you have felt or behaved a certain way. Still, you may feel overwhelmed about the treatment ahead. You may also wonder how to get the support you need and how to deal with the condition day-to-day. With treatment and support, you can live with ADHD and manage your symptoms.  How to manage lifestyle changes  Managing stress  Stress is your body's reaction to life changes and events, both good and bad. To cope with the stress of an ADHD diagnosis, it may help to:  · Learn more about ADHD.  · Exercise regularly. Even a short daily walk can lower stress levels.  · Participate in training or education programs (including social skills training classes) that teach you to deal with symptoms.    Medicines  Your health care provider may suggest certain medicines if he or she feels that they will help to improve your condition. Stimulant medicines are usually prescribed to treat ADHD, and therapy may also be prescribed. It is important to:  · Avoid using alcohol and other substances that may prevent your medicines from working properly (may interact).  · Talk with your pharmacist or health care provider about all the medicines that you take, their possible side effects, and what medicines are safe to take together.  · Make it your goal to take part in all treatment decisions (shared decision-making). Ask about possible side effects of medicines that your health care provider recommends, and tell him or her how you feel about having those side effects. It is best if shared decision-making with your health care provider is part of your total treatment plan.  Relationships  To strengthen your relationships with family members while treating your condition, consider taking part in family therapy. You might also attend self-help groups alone or with a loved  one.  Be honest about how your symptoms affect your relationships. Make an effort to communicate respectfully instead of fighting, and find ways to show others that you care. Psychotherapy may be useful in helping you cope with how ADHD affects your relationships.  How to recognize changes in your condition  The following signs may mean that your treatment is working well and your condition is improving:  · Consistently being on time for appointments.  · Being more organized at home and work.  · Other people noticing improvements in your behavior.  · Achieving goals that you set for yourself.  · Thinking more clearly.  The following signs may mean that your treatment is not working very well:  · Feeling impatience or more confusion.  · Missing, forgetting, or being late for appointments.  · An increasing sense of disorganization and messiness.  · More difficulty in reaching goals that you set for yourself.  · Loved ones becoming angry or frustrated with you.  Follow these instructions at home:  · Take over-the-counter and prescription medicines only as told by your health care provider. Check with your health care provider before taking any new medicines.  · Create structure and an organized atmosphere at home. For example:  ? Make a list of tasks, then rank them from most important to least important. Work on one task at a time until your listed tasks are done.  ? Make a daily schedule and follow it consistently every day.  ? Use an appointment calendar, and check it 2 or 3 times a day to keep on track. Keep it with you when you leave the house.  ? Create spaces where you keep certain things, and always put things back in their places after you use them.  · Keep all follow-up visits as told by your health care provider. This is important.  Where to find support  Talking to others    · Keep emotion out of important discussions and speak in a calm, logical way.  · Listen closely and patiently to your loved ones. Try  to understand their point of view, and try to avoid getting defensive.  · Take responsibility for the consequences of your actions.  · Ask that others do not take your behaviors personally.  · Aim to solve problems as they come up, and express your feelings instead of bottling them up.  · Talk openly about what you need from your loved ones and how they can support you.  · Consider going to family therapy sessions or having your family meet with a specialist who deals with ADHD-related behavior problems.    Finances  Not all insurance plans cover mental health care, so it is important to check with your insurance carrier. If paying for co-pays or counseling services is a problem, search for a [facility] or [facility] mental health care center. Public mental health care services may be offered there at a low cost or no cost when you are not able to see a private health care provider.  If you are taking medicine for ADHD, you may be able to get the generic form, which may be less expensive than brand-name medicine. Some makers of prescription medicines also offer help to patients who cannot afford the medicines that they need.  Questions to ask your health care provider:  · What are the risks and benefits of taking medicines?  · Would I benefit from therapy?  · How often should I follow up with a health care provider?  Contact a health care provider if:  · You have side effects from your medicines, such as:  ? Repeated muscle twitches, coughing, or speech outbursts.  ? Sleep problems.  ? Loss of appetite.  ? Depression.  ? New or worsening behavior problems.  ? Dizziness.  ? Unusually fast heartbeat.  ? Stomach pains.  ? Headaches.  Get help right away if:  · You have a severe reaction to a medicine.  · Your behavior suddenly gets worse.  Summary  · With treatment and support, you can live with ADHD and manage your symptoms.  · The medicines that are most often prescribed for ADHD are stimulants.  · Consider taking part in  family therapy or self-help groups with family members or friends.  · When you talk with friends and family about your ADHD, be patient and communicate openly.  · Take over-the-counter and prescription medicines only as told by your health care provider. Check with your health care provider before taking any new medicines.  This information is not intended to replace advice given to you by your health care provider. Make sure you discuss any questions you have with your health care provider.  Document Revised: 06/02/2021 Document Reviewed: 06/02/2021  Elsevier Patient Education © 2021 [employer]

## 2022-02-18 DIAGNOSIS — F90.2 ATTENTION DEFICIT HYPERACTIVITY DISORDER (ADHD), COMBINED TYPE: Chronic | ICD-10-CM

## 2022-02-18 NOTE — TELEPHONE ENCOUNTER
Rx Refill Note  Requested Prescriptions     Pending Prescriptions Disp Refills   • lisdexamfetamine (Vyvanse) 70 MG capsule 30 capsule 0     Sig: Take 1 capsule by mouth Every Morning      Last office visit with prescribing clinician: 1/21/2022      Next office visit with prescribing clinician: 5/27/2022   Office Visit with Estrellita Lieberman MD (01/21/2022)       SCANNED - LABS (09/21/2021)      Steph Aguilar MA  02/18/22, 11:56 EST     No info on Inspect; [place] needed.

## 2022-03-01 ENCOUNTER — OFFICE VISIT (OUTPATIENT)
Dept: FAMILY MEDICINE CLINIC | Facility: CLINIC | Age: 37
End: 2022-03-01

## 2022-03-01 VITALS
HEART RATE: 94 BPM | RESPIRATION RATE: 20 BRPM | WEIGHT: 216.7 LBS | DIASTOLIC BLOOD PRESSURE: 80 MMHG | HEIGHT: 70 IN | BODY MASS INDEX: 31.02 KG/M2 | OXYGEN SATURATION: 98 % | SYSTOLIC BLOOD PRESSURE: 140 MMHG

## 2022-03-01 DIAGNOSIS — H91.90 HEARING DIFFICULTY, UNSPECIFIED LATERALITY: ICD-10-CM

## 2022-03-01 DIAGNOSIS — E73.9 LACTOSE INTOLERANCE: ICD-10-CM

## 2022-03-01 DIAGNOSIS — K21.9 GASTROESOPHAGEAL REFLUX DISEASE WITHOUT ESOPHAGITIS: Primary | ICD-10-CM

## 2022-03-01 DIAGNOSIS — Z79.899 HIGH RISK MEDICATION USE: ICD-10-CM

## 2022-03-01 DIAGNOSIS — R10.13 DYSPEPSIA: ICD-10-CM

## 2022-03-01 DIAGNOSIS — Z00.00 HEALTH MAINTENANCE EXAMINATION: ICD-10-CM

## 2022-03-01 DIAGNOSIS — H93.19 TINNITUS, UNSPECIFIED LATERALITY: ICD-10-CM

## 2022-03-01 DIAGNOSIS — F90.2 ATTENTION DEFICIT HYPERACTIVITY DISORDER (ADHD), COMBINED TYPE: ICD-10-CM

## 2022-03-01 PROCEDURE — 99204 OFFICE O/P NEW MOD 45 MIN: CPT | Performed by: NURSE PRACTITIONER

## 2022-03-01 RX ORDER — OMEPRAZOLE 20 MG/1
20 CAPSULE, DELAYED RELEASE ORAL 2 TIMES DAILY
Qty: 180 CAPSULE | Refills: 3 | Status: SHIPPED | OUTPATIENT
Start: 2022-03-01

## 2022-03-01 RX ORDER — OMEPRAZOLE 20 MG/1
20 CAPSULE, DELAYED RELEASE ORAL 2 TIMES DAILY
COMMUNITY
End: 2022-03-01
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
## 2022-03-01 NOTE — PATIENT INSTRUCTIONS
Discharge instructions continue present plan omeprazole 40 mg daily  Dr. Melgar gastro to discuss the other dyspepsia and food issues that she may have for further evaluation  If you get an EGD schedule a follow-up appointment to connect the diets and make sure good communication works well for you

## 2022-03-16 DIAGNOSIS — F90.2 ATTENTION DEFICIT HYPERACTIVITY DISORDER (ADHD), COMBINED TYPE: Chronic | ICD-10-CM

## 2022-04-14 DIAGNOSIS — F90.2 ATTENTION DEFICIT HYPERACTIVITY DISORDER (ADHD), COMBINED TYPE: Chronic | ICD-10-CM

## 2022-04-14 NOTE — TELEPHONE ENCOUNTER
Rx Refill Note  Requested Prescriptions     Pending Prescriptions Disp Refills   • lisdexamfetamine (Vyvanse) 70 MG capsule 30 capsule 0     Sig: Take 1 capsule by mouth Every Morning      Last office visit with prescribing clinician: 1/21/2022      Next office visit with prescribing clinician: 5/27/2022   Office Visit with Estrellita Lieberman MD (01/21/2022)       SCANNED - LABS (09/21/2021)      Steph Aguilar MA  04/14/22, 09:52 EDT     No info on Inspect; [place] needed.

## 2022-05-12 DIAGNOSIS — F90.2 ATTENTION DEFICIT HYPERACTIVITY DISORDER (ADHD), COMBINED TYPE: Chronic | ICD-10-CM

## 2022-05-27 ENCOUNTER — OFFICE VISIT (OUTPATIENT)
Dept: PSYCHIATRY | Facility: CLINIC | Age: 37
End: 2022-05-27

## 2022-05-27 DIAGNOSIS — F90.2 ATTENTION DEFICIT HYPERACTIVITY DISORDER (ADHD), COMBINED TYPE: Primary | Chronic | ICD-10-CM

## 2022-05-27 DIAGNOSIS — F43.12 CHRONIC POST-TRAUMATIC STRESS DISORDER (PTSD): Chronic | ICD-10-CM

## 2022-05-27 PROCEDURE — 99213 OFFICE O/P EST LOW 20 MIN: CPT | Performed by: PSYCHIATRY & NEUROLOGY
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
## 2022-07-05 DIAGNOSIS — F90.2 ATTENTION DEFICIT HYPERACTIVITY DISORDER (ADHD), COMBINED TYPE: Chronic | ICD-10-CM

## 2022-07-05 NOTE — TELEPHONE ENCOUNTER
Rx Refill Note  Requested Prescriptions     Pending Prescriptions Disp Refills   • lisdexamfetamine (Vyvanse) 70 MG capsule 30 capsule 0     Sig: Take 1 capsule by mouth Every Morning      Last office visit with prescribing clinician: 5/27/2022      Next office visit with prescribing clinician: 9/30/2022   Office Visit with Estrellita Lieberman MD (05/27/2022)       SCANNED - LABS (09/21/2021)      Steph Aguilar MA  07/05/22, 14:09 EDT     Needs thanh; no info on inspect

## 2022-08-02 DIAGNOSIS — F90.2 ATTENTION DEFICIT HYPERACTIVITY DISORDER (ADHD), COMBINED TYPE: Chronic | ICD-10-CM

## 2022-08-02 NOTE — TELEPHONE ENCOUNTER
Rx Refill Note  Requested Prescriptions     Pending Prescriptions Disp Refills   • lisdexamfetamine (Vyvanse) 70 MG capsule 30 capsule 0     Sig: Take 1 capsule by mouth Every Morning      Last office visit with prescribing clinician: 5/27/2022      Next office visit with prescribing clinician: 9/30/2022   Office Visit with Estrellita Lieberman MD (05/27/2022)       SCANNED - LABS (09/21/2021)      Steph Aguilar MA  08/02/22, 13:40 EDT     thanh needed; no info on Inspect

## 2022-09-06 DIAGNOSIS — F90.2 ATTENTION DEFICIT HYPERACTIVITY DISORDER (ADHD), COMBINED TYPE: Chronic | ICD-10-CM

## 2022-09-06 NOTE — TELEPHONE ENCOUNTER
Rx Refill Note  Requested Prescriptions     Pending Prescriptions Disp Refills   • lisdexamfetamine (Vyvanse) 70 MG capsule 30 capsule 0     Sig: Take 1 capsule by mouth Every Morning      Last office visit with prescribing clinician: 5/27/2022      Next office visit with prescribing clinician: 9/30/2022   Office Visit with Estrellita Lieberman MD (05/27/2022)       SCANNED - LABS (09/21/2021)      Steph Aguilar MA  09/06/22, 08:59 EDT     No info on Inspect; thanh needed

## 2022-09-30 ENCOUNTER — OFFICE VISIT (OUTPATIENT)
Dept: PSYCHIATRY | Facility: CLINIC | Age: 37
End: 2022-09-30

## 2022-09-30 DIAGNOSIS — F43.12 CHRONIC POST-TRAUMATIC STRESS DISORDER (PTSD): Chronic | ICD-10-CM

## 2022-09-30 DIAGNOSIS — Z79.899 ENCOUNTER FOR LONG-TERM (CURRENT) USE OF OTHER MEDICATIONS: ICD-10-CM

## 2022-09-30 DIAGNOSIS — F90.2 ATTENTION DEFICIT HYPERACTIVITY DISORDER (ADHD), COMBINED TYPE: Primary | Chronic | ICD-10-CM

## 2022-09-30 PROCEDURE — 99214 OFFICE O/P EST MOD 30 MIN: CPT | Performed by: PSYCHIATRY & NEUROLOGY
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
## 2022-11-07 DIAGNOSIS — F90.2 ATTENTION DEFICIT HYPERACTIVITY DISORDER (ADHD), COMBINED TYPE: Chronic | ICD-10-CM

## 2022-11-07 NOTE — TELEPHONE ENCOUNTER
Rx Refill Note  Requested Prescriptions     Pending Prescriptions Disp Refills   • lisdexamfetamine (Vyvanse) 70 MG capsule 30 capsule 0     Sig: Take 1 capsule by mouth Every Morning      Last office visit with prescribing clinician: 9/30/2022      Next office visit with prescribing clinician: 2/3/2023   Office Visit with Estrellita Lieberman MD (09/30/2022)       SCANNED - LABS (09/30/2022)      Steph Aguilar MA  11/07/22, 08:21 EST     Inspect printed; last fill 10-04

## 2022-12-09 DIAGNOSIS — F90.2 ATTENTION DEFICIT HYPERACTIVITY DISORDER (ADHD), COMBINED TYPE: Chronic | ICD-10-CM

## 2022-12-09 NOTE — TELEPHONE ENCOUNTER
Rx Refill Note  Requested Prescriptions     Pending Prescriptions Disp Refills   • lisdexamfetamine (Vyvanse) 70 MG capsule 30 capsule 0     Sig: Take 1 capsule by mouth Every Morning      Last office visit with prescribing clinician: 9/30/2022   Last telemedicine visit with prescribing clinician: 2/3/2023   Next office visit with prescribing clinician: 2/3/2023   Office Visit with Estrellita Lieberman MD (09/30/2022)       SCANNED - LABS (09/30/2022)                   Would you like a call back once the refill request has been completed: [] Yes [] No    If the office needs to give you a call back, can they leave a voicemail: [] Yes [] No    Steph Aguilar MA  12/09/22, 08:56 EST     Inspect printed; last fill 11/09

## 2023-01-05 DIAGNOSIS — F90.2 ATTENTION DEFICIT HYPERACTIVITY DISORDER (ADHD), COMBINED TYPE: Chronic | ICD-10-CM

## 2023-01-05 NOTE — TELEPHONE ENCOUNTER
Rx Refill Note  Requested Prescriptions     Pending Prescriptions Disp Refills   • lisdexamfetamine (Vyvanse) 70 MG capsule 30 capsule 0     Sig: Take 1 capsule by mouth Every Morning      Last office visit with prescribing clinician: 9/30/2022   Last telemedicine visit with prescribing clinician: 2/3/2023   Next office visit with prescribing clinician: 2/3/2023   Office Visit with Estrellita Lieberman MD (09/30/2022)       SCANNED - LABS (09/30/2022)                   Would you like a call back once the refill request has been completed: [] Yes [] No    If the office needs to give you a call back, can they leave a voicemail: [] Yes [] No    Steph Aguilar MA  01/05/23, 10:58 EST     Unable to get pharmacist on phone; Last fill 12-09

## 2023-02-03 ENCOUNTER — OFFICE VISIT (OUTPATIENT)
Dept: PSYCHIATRY | Facility: CLINIC | Age: 38
End: 2023-02-03
Payer: COMMERCIAL

## 2023-02-03 DIAGNOSIS — F90.2 ATTENTION DEFICIT HYPERACTIVITY DISORDER (ADHD), COMBINED TYPE: Primary | Chronic | ICD-10-CM

## 2023-02-03 DIAGNOSIS — F43.12 CHRONIC POST-TRAUMATIC STRESS DISORDER (PTSD): Chronic | ICD-10-CM

## 2023-02-03 PROCEDURE — 99214 OFFICE O/P EST MOD 30 MIN: CPT | Performed by: PSYCHIATRY & NEUROLOGY
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
## 2023-03-01 DIAGNOSIS — F90.2 ATTENTION DEFICIT HYPERACTIVITY DISORDER (ADHD), COMBINED TYPE: Chronic | ICD-10-CM

## 2023-03-01 NOTE — TELEPHONE ENCOUNTER
Rx Refill Note  Requested Prescriptions     Pending Prescriptions Disp Refills   • lisdexamfetamine (Vyvanse) 70 MG capsule 30 capsule 0     Sig: Take 1 capsule by mouth Every Morning      Last office visit with prescribing clinician: 2/3/2023   Last telemedicine visit with prescribing clinician: 6/2/2023   Next office visit with prescribing clinician: 6/2/2023   Office Visit with Estrellita Lieberman MD (02/03/2023)      SCANNED - LABS (09/30/2022)                    Would you like a call back once the refill request has been completed: [] Yes [] No    If the office needs to give you a call back, can they leave a voicemail: [] Yes [] No    Steph Aguilar MA  03/01/23, 08:55 EST     In stock every month at [facility]; Inspect printed; last fill 2-03

## 2023-03-31 DIAGNOSIS — F90.2 ATTENTION DEFICIT HYPERACTIVITY DISORDER (ADHD), COMBINED TYPE: Chronic | ICD-10-CM

## 2023-03-31 NOTE — TELEPHONE ENCOUNTER
Rx Refill Note  Requested Prescriptions      No prescriptions requested or ordered in this encounter      Last office visit with prescribing clinician: 2/3/2023   Next office visit with prescribing clinician: 6/2/2023     Progress Notes by Esrtellita Lieberman MD (02/03/2023 08:15)    SCANNED - LABS (09/30/2022)    Inspect - 3-3-2023      Sylvie Wilson MA  03/31/23, 10:37 EDT

## 2023-04-28 DIAGNOSIS — F90.2 ATTENTION DEFICIT HYPERACTIVITY DISORDER (ADHD), COMBINED TYPE: Chronic | ICD-10-CM

## 2023-04-28 NOTE — TELEPHONE ENCOUNTER
Rx Refill Note  Requested Prescriptions     Pending Prescriptions Disp Refills   • lisdexamfetamine (Vyvanse) 70 MG capsule 30 capsule 0     Sig: Take 1 capsule by mouth Every Morning      Last office visit with prescribing clinician: 2/3/2023   Last telemedicine visit with prescribing clinician: 6/2/2023   Next office visit with prescribing clinician: 6/2/2023   Office Visit with Estrellita Lieberman MD (02/03/2023)       SCANNED - LABS (09/30/2022)                   Would you like a call back once the refill request has been completed: [] Yes [] No    If the office needs to give you a call back, can they leave a voicemail: [] Yes [] No    Steph Aguilar MA  04/28/23, 09:08 EDT     Inspect printed; last fill 3-31

## 2023-05-25 DIAGNOSIS — F90.2 ATTENTION DEFICIT HYPERACTIVITY DISORDER (ADHD), COMBINED TYPE: Chronic | ICD-10-CM

## 2023-05-25 NOTE — TELEPHONE ENCOUNTER
Rx Refill Note  Requested Prescriptions     Pending Prescriptions Disp Refills   • lisdexamfetamine (Vyvanse) 70 MG capsule 30 capsule 0     Sig: Take 1 capsule by mouth Every Morning      Last office visit with prescribing clinician: 2/3/2023   Last telemedicine visit with prescribing clinician: Visit date not found   Next office visit with prescribing clinician: 6/2/2023   Office Visit with Estrellita Lieberman MD (02/03/2023)  SCANNED - LABS (09/30/2022)                        Would you like a call back once the refill request has been completed: [] Yes [] No    If the office needs to give you a call back, can they leave a voicemail: [] Yes [] No    Steph Aguilar MA  05/25/23, 09:37 EDT     Inspect printed; last fill 4-28

## 2023-06-02 ENCOUNTER — OFFICE VISIT (OUTPATIENT)
Dept: PSYCHIATRY | Facility: CLINIC | Age: 38
End: 2023-06-02

## 2023-06-02 DIAGNOSIS — F43.12 CHRONIC POST-TRAUMATIC STRESS DISORDER (PTSD): Chronic | ICD-10-CM

## 2023-06-02 DIAGNOSIS — F90.2 ATTENTION DEFICIT HYPERACTIVITY DISORDER (ADHD), COMBINED TYPE: Primary | Chronic | ICD-10-CM
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
## 2023-07-24 DIAGNOSIS — F90.2 ATTENTION DEFICIT HYPERACTIVITY DISORDER (ADHD), COMBINED TYPE: Chronic | ICD-10-CM

## 2023-07-24 NOTE — TELEPHONE ENCOUNTER
Rx Refill Note  Requested Prescriptions     Pending Prescriptions Disp Refills    lisdexamfetamine (Vyvanse) 70 MG capsule 30 capsule 0     Sig: Take 1 capsule by mouth Every Morning      Last office visit with prescribing clinician: 6/2/2023   Last telemedicine visit with prescribing clinician: Visit date not found   Next office visit with prescribing clinician: 9/29/2023   Office Visit with Estrellita Lieberman MD (06/02/2023)   SCANNED - LABS (09/30/2022)                     Would you like a call back once the refill request has been completed: [] Yes [] No    If the office needs to give you a call back, can they leave a voicemail: [] Yes [] No    Steph Aguilar MA  07/24/23, 10:09 EDT    Inspect printed; last fill 6-27-23; pt says he will wait for a shipment if it is not in stock at [place], [place]

## 2023-07-26 ENCOUNTER — TELEPHONE (OUTPATIENT)
Dept: PSYCHIATRY | Facility: CLINIC | Age: 38
End: 2023-07-26
Payer: COMMERCIAL

## 2023-07-26 DIAGNOSIS — F90.2 ATTENTION DEFICIT HYPERACTIVITY DISORDER (ADHD), COMBINED TYPE: Primary | ICD-10-CM

## 2023-07-26 NOTE — TELEPHONE ENCOUNTER
Pt says his pharmacy does not have his Vyvanse 70 mg, so he found out that [facility] in [facility] has 30 mg and 40 mg in stock.  He is asking if you could send this instead.  I told him his insurance might not cover it that way.  Please advise.

## 2023-08-25 DIAGNOSIS — F90.2 ATTENTION DEFICIT HYPERACTIVITY DISORDER (ADHD), COMBINED TYPE: ICD-10-CM

## 2023-08-25 NOTE — TELEPHONE ENCOUNTER
Rx Refill Note  Requested Prescriptions     Pending Prescriptions Disp Refills    lisdexamfetamine (Vyvanse) 30 MG capsule 30 capsule 0     Sig: Take 1 capsule by mouth Every Morning    lisdexamfetamine (Vyvanse) 40 MG capsule 30 capsule 0     Sig: Take 1 capsule by mouth Every Morning      Last office visit with prescribing clinician: 6/2/2023   Last telemedicine visit with prescribing clinician: Visit date not found   Next office visit with prescribing clinician: 9/29/2023   Office Visit with Estrellita Lieberman MD (06/02/2023)   SCANNED - LABS (09/30/2022)                     Would you like a call back once the refill request has been completed: [] Yes [] No    If the office needs to give you a call back, can they leave a voicemail: [] Yes [] No    Steph Aguilar MA  08/25/23, 15:44 EDT    Pt says he will wait for vyvanse 40 and 30 mg at [facility] in [place], [place]; last fill 7-26-23

## 2023-09-11 ENCOUNTER — NURSE TRIAGE (OUTPATIENT)
Dept: CALL CENTER | Facility: HOSPITAL | Age: 38
End: 2023-09-11
Payer: COMMERCIAL

## 2023-09-11 NOTE — TELEPHONE ENCOUNTER
HUB- He is trying get established with the [facility] [place] office. He would be a new patient. He has had numbness and tingling in both hands for longer than 1 month. Declines triage and ER. The call was sent to the back line. He is being scheduled for a new patient office visit. [id number]

## 2023-09-11 NOTE — PROGRESS NOTES
"Chief Complaint  Establish Care and Numbness (Pt c/o numbness in 3 fingers on each hand for a month.)    Subjective          Jerry Longoria presents to [facility]  History of Present Illness  He is here to establish care.     ADHD: He is taking Vyvanse.  Does follow with psychiatry every 4 months.    He reports that he is having numbness in both of his hands in both thumbs, pointers, and middle fingers. This has been present for about a month. He does type some days a lot. He does have some limited ROM with his wrist. He reports bending his wrist will make his symptoms worse. He does feel like he hands will fall asleep. He has tried ibuprofen. He has tried OTC collagen has helped some.     Objective   Vital Signs:   /89 (BP Location: Left arm, Patient Position: Sitting)   Pulse 84   Ht 177.8 cm (70\")   Wt 101 kg (221 lb 9.6 oz)   BMI 31.80 kg/m²     Physical Exam  Constitutional:       General: He is not in acute distress.     Appearance: Normal appearance. He is normal weight.   HENT:      Head: Normocephalic.   Eyes:      Pupils: Pupils are equal, round, and reactive to light.      Visual Fields: Right eye visual fields normal and left eye visual fields normal.   Neck:      Trachea: Trachea normal.   Cardiovascular:      Rate and Rhythm: Normal rate and regular rhythm.      Heart sounds: Normal heart sounds.   Pulmonary:      Effort: Pulmonary effort is normal.      Breath sounds: Normal breath sounds and air entry.   Musculoskeletal:      Right lower leg: No edema.      Left lower leg: No edema.      Comments: Negative Phalen maneuver bilateral hands, positive Tinel test bilateral hands   Skin:     General: Skin is warm and dry.   Neurological:      Mental Status: He is alert and oriented to person, place, and time.   Psychiatric:         Mood and Affect: Mood and affect normal.         Behavior: Behavior normal.         Thought Content: Thought content normal.    "   Result Review :   The following data was reviewed by: RICHARD Moreno on 09/13/2023:                  Assessment and Plan    Diagnoses and all orders for this visit:    1. Encounter to establish care (Primary)    2. Numbness and tingling in both hands  Assessment & Plan:  I believe that his symptoms may be due to carpal tunnel.  We will check labs and obtain an EMG.  We will send in meloxicam and see if that will help.  I do recommend that he brace both of his wrist at night for at least a month. May consider referral to a hand specialist.    Orders:  -     CBC (No Diff); Future  -     Comprehensive Metabolic Panel; Future  -     TSH; Future  -     Vitamin B12; Future  -     Folate; Future  -     EMG & Nerve Conduction Test; Future  -     meloxicam (Mobic) 7.5 MG tablet; Take 1 tablet by mouth Daily.  Dispense: 30 tablet; Refill: 1    3. Need for hepatitis C screening test  -     Hepatitis C Antibody; Future    4. Primary hypertension  Assessment & Plan:  He reports that his blood pressure has been elevated after receiving an injection while he was in the [employer], that he believes may have been experimental.  He has seen a cardiologist in the past, due to his psychiatrist wanting him to get an evaluation prior to starting ADHD medication.  Patient reports that the cardiologist did not find anything concerning.  We have discussed the reason for keeping his blood pressure normal and with the sequelae of chronic high blood pressure can cause.  The patient is not obese, but his BMI is over 30.  The patient is very muscular.  He is going to think about possibly starting medication.    BMI is >= 30 and <35. (Class 1 Obesity). The following options were offered after discussion;: the patient is very muscular and works out in the gym.        5. Attention deficit hyperactivity disorder (ADHD), combined type  Assessment & Plan:  Continue follow-up with his psychiatrist.            Follow Up   Return for Pending test  results.  Patient was given instructions and counseling regarding his condition or for health maintenance advice. Please see specific information pulled into the AVS if appropriate.

## 2023-09-11 NOTE — TELEPHONE ENCOUNTER
"HUB- He is trying get established with the [facility] office. He would be a new patient. He has had numbness and tingling in both hands for longer than 1 month. Declines triage and ER. The call was sent to the back line. He is being scheduled for a new patient office visit. [id number]  Reason for Disposition   Caller requesting an appointment, triage offered and declined    Additional Information   Negative: Lab calling with strep throat test results and triager can call in prescription   Negative: Lab calling with urinalysis test results and triager can call in prescription   Negative: Medication questions   Negative: Medication renewal and refill questions   Negative: Pre-operative or pre-procedural questions   Negative: ED call to PCP (i.e., primary care provider; doctor, NP, or PA)   Negative: Doctor (or NP/PA) call to PCP   Negative: Call about patient who is currently hospitalized   Negative: Lab or radiology calling with CRITICAL test results   Negative: [1] Follow-up call from patient regarding patient's clinical status AND [2] information urgent   Negative: [1] Caller requests to speak ONLY to PCP AND [2] URGENT question   Negative: [1] Caller requests to speak to PCP now AND [2] won't tell us reason for call  (Exception: If 10 pm to 6 am, caller must first discuss reason for the call.)   Negative: Notification of hospital admission   Negative: Notification of death   Negative: Caller requesting lab results  (Exception: Routine or non-urgent lab result.)   Negative: Lab or radiology calling with test results   Negative: [1] Follow-up call from patient regarding patient's clinical status AND [2] information NON-URGENT   Negative: [1] Caller requests to speak ONLY to PCP AND [2] NON-URGENT question    Answer Assessment - Initial Assessment Questions  1. REASON FOR CALL or QUESTION: \"What is your reason for calling today?\" or \"How can I best  help you?\" or \"What question do you have that I can help answer?\"      " Need to become an established patient.   2. CALLER: Document the source of call. (e.g., laboratory, patient).      Self.    Protocols used: PCP Call - No Triage-ADULT-

## 2023-09-13 ENCOUNTER — OFFICE VISIT (OUTPATIENT)
Dept: FAMILY MEDICINE CLINIC | Age: 38
End: 2023-09-13
Payer: COMMERCIAL

## 2023-09-13 ENCOUNTER — LAB (OUTPATIENT)
Dept: LAB | Facility: HOSPITAL | Age: 38
End: 2023-09-13
Payer: COMMERCIAL

## 2023-09-13 VITALS
SYSTOLIC BLOOD PRESSURE: 162 MMHG | DIASTOLIC BLOOD PRESSURE: 89 MMHG | HEIGHT: 70 IN | BODY MASS INDEX: 31.73 KG/M2 | WEIGHT: 221.6 LBS | HEART RATE: 84 BPM

## 2023-09-13 DIAGNOSIS — Z76.89 ENCOUNTER TO ESTABLISH CARE: Primary | ICD-10-CM

## 2023-09-13 DIAGNOSIS — I10 PRIMARY HYPERTENSION: ICD-10-CM

## 2023-09-13 DIAGNOSIS — R20.0 NUMBNESS AND TINGLING IN BOTH HANDS: ICD-10-CM

## 2023-09-13 DIAGNOSIS — Z11.59 NEED FOR HEPATITIS C SCREENING TEST: ICD-10-CM

## 2023-09-13 DIAGNOSIS — F90.2 ATTENTION DEFICIT HYPERACTIVITY DISORDER (ADHD), COMBINED TYPE: Chronic | ICD-10-CM

## 2023-09-13 DIAGNOSIS — R20.2 NUMBNESS AND TINGLING IN BOTH HANDS: ICD-10-CM

## 2023-09-13 LAB
ALBUMIN SERPL-MCNC: 4.7 G/DL (ref 3.5–5.2)
ALBUMIN/GLOB SERPL: 1.8 G/DL
ALP SERPL-CCNC: 74 U/L (ref 39–117)
ALT SERPL W P-5'-P-CCNC: 37 U/L (ref 1–41)
ANION GAP SERPL CALCULATED.3IONS-SCNC: 8.2 MMOL/L (ref 5–15)
AST SERPL-CCNC: 40 U/L (ref 1–40)
BILIRUB SERPL-MCNC: 1 MG/DL (ref 0–1.2)
BUN SERPL-MCNC: 10 MG/DL (ref 6–20)
BUN/CREAT SERPL: 8.6 (ref 7–25)
CALCIUM SPEC-SCNC: 9.8 MG/DL (ref 8.6–10.5)
CHLORIDE SERPL-SCNC: 100 MMOL/L (ref 98–107)
CO2 SERPL-SCNC: 28.8 MMOL/L (ref 22–29)
CREAT SERPL-MCNC: 1.16 MG/DL (ref 0.76–1.27)
DEPRECATED RDW RBC AUTO: 45.3 FL (ref 37–54)
EGFRCR SERPLBLD CKD-EPI 2021: 83.2 ML/MIN/1.73
ERYTHROCYTE [DISTWIDTH] IN BLOOD BY AUTOMATED COUNT: 13.2 % (ref 12.3–15.4)
GLOBULIN UR ELPH-MCNC: 2.6 GM/DL
GLUCOSE SERPL-MCNC: 104 MG/DL (ref 65–99)
HCT VFR BLD AUTO: 52.3 % (ref 37.5–51)
HGB BLD-MCNC: 17.3 G/DL (ref 13–17.7)
MCH RBC QN AUTO: 30.4 PG (ref 26.6–33)
MCHC RBC AUTO-ENTMCNC: 33.1 G/DL (ref 31.5–35.7)
MCV RBC AUTO: 91.9 FL (ref 79–97)
PLATELET # BLD AUTO: 276 10*3/MM3 (ref 140–450)
PMV BLD AUTO: 9.5 FL (ref 6–12)
POTASSIUM SERPL-SCNC: 4.3 MMOL/L (ref 3.5–5.2)
PROT SERPL-MCNC: 7.3 G/DL (ref 6–8.5)
RBC # BLD AUTO: 5.69 10*6/MM3 (ref 4.14–5.8)
SODIUM SERPL-SCNC: 137 MMOL/L (ref 136–145)
TSH SERPL DL<=0.05 MIU/L-ACNC: 1.35 UIU/ML (ref 0.27–4.2)
WBC NRBC COR # BLD: 4.47 10*3/MM3 (ref 3.4–10.8)

## 2023-09-13 PROCEDURE — 86803 HEPATITIS C AB TEST: CPT

## 2023-09-13 PROCEDURE — 80050 GENERAL HEALTH PANEL: CPT

## 2023-09-13 PROCEDURE — 36415 COLL VENOUS BLD VENIPUNCTURE: CPT

## 2023-09-13 PROCEDURE — 82746 ASSAY OF FOLIC ACID SERUM: CPT

## 2023-09-13 PROCEDURE — 82607 VITAMIN B-12: CPT

## 2023-09-13 RX ORDER — MELOXICAM 7.5 MG/1
7.5 TABLET ORAL DAILY
Qty: 30 TABLET | Refills: 1 | Status: SHIPPED | OUTPATIENT
Start: 2023-09-13

## 2023-09-13 NOTE — ASSESSMENT & PLAN NOTE
I believe that his symptoms may be due to carpal tunnel.  We will check labs and obtain an EMG.  We will send in meloxicam and see if that will help.  I do recommend that he brace both of his wrist at night for at least a month. May consider referral to a hand specialist.

## 2023-09-14 LAB
FOLATE SERPL-MCNC: 10.2 NG/ML (ref 4.78–24.2)
HCV AB SER DONR QL: NORMAL
VIT B12 BLD-MCNC: 364 PG/ML (ref 211–946)

## 2023-09-15 DIAGNOSIS — R71.8 ELEVATED HEMATOCRIT: Primary | ICD-10-CM

## 2023-09-22 DIAGNOSIS — F90.2 ATTENTION DEFICIT HYPERACTIVITY DISORDER (ADHD), COMBINED TYPE: ICD-10-CM

## 2023-09-22 NOTE — TELEPHONE ENCOUNTER
Rx Refill Note  Requested Prescriptions     Pending Prescriptions Disp Refills    lisdexamfetamine (Vyvanse) 30 MG capsule 30 capsule 0     Sig: Take 1 capsule by mouth Every Morning    lisdexamfetamine (Vyvanse) 40 MG capsule 30 capsule 0     Sig: Take 1 capsule by mouth Every Morning      Last office visit with prescribing clinician: 6/2/2023   Last telemedicine visit with prescribing clinician: Visit date not found   Next office visit with prescribing clinician: 9/29/2023   Office Visit with Estrellita Lieberman MD (06/02/2023)   SCANNED - LABS (09/30/2022)                     Would you like a call back once the refill request has been completed: [] Yes [] No    If the office needs to give you a call back, can they leave a voicemail: [] Yes [] No    Steph Aguilar MA  09/22/23, 09:34 EDT    LAST FILL BOTH 8-26-23; PT SAYS HE WILL WAIT FOR A SHIPMENT; UPLOADED

## 2023-09-29 ENCOUNTER — OFFICE VISIT (OUTPATIENT)
Dept: PSYCHIATRY | Facility: CLINIC | Age: 38
End: 2023-09-29
Payer: COMMERCIAL

## 2023-09-29 DIAGNOSIS — F43.12 CHRONIC POST-TRAUMATIC STRESS DISORDER (PTSD): Chronic | ICD-10-CM

## 2023-09-29 DIAGNOSIS — Z79.899 ENCOUNTER FOR LONG-TERM (CURRENT) USE OF OTHER MEDICATIONS: ICD-10-CM

## 2023-09-29 DIAGNOSIS — F90.2 ATTENTION DEFICIT HYPERACTIVITY DISORDER (ADHD), COMBINED TYPE: Primary | Chronic | ICD-10-CM
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
## 2023-10-20 ENCOUNTER — PATIENT MESSAGE (OUTPATIENT)
Dept: FAMILY MEDICINE CLINIC | Age: 38
End: 2023-10-20
Payer: COMMERCIAL

## 2023-10-26 DIAGNOSIS — F90.2 ATTENTION DEFICIT HYPERACTIVITY DISORDER (ADHD), COMBINED TYPE: ICD-10-CM

## 2023-10-26 RX ORDER — LISDEXAMFETAMINE DIMESYLATE CAPSULES 40 MG/1
40 CAPSULE ORAL EVERY MORNING
Qty: 30 CAPSULE | Refills: 0 | Status: SHIPPED | OUTPATIENT
Start: 2023-10-26

## 2023-10-26 RX ORDER — LISDEXAMFETAMINE DIMESYLATE CAPSULES 30 MG/1
30 CAPSULE ORAL EVERY MORNING
Qty: 30 CAPSULE | Refills: 0 | Status: SHIPPED | OUTPATIENT
Start: 2023-10-26

## 2023-10-26 NOTE — TELEPHONE ENCOUNTER
Rx Refill Note  Requested Prescriptions     Pending Prescriptions Disp Refills    lisdexamfetamine (Vyvanse) 30 MG capsule 30 capsule 0     Sig: Take 1 capsule by mouth Every Morning    lisdexamfetamine (Vyvanse) 40 MG capsule 30 capsule 0     Sig: Take 1 capsule by mouth Every Morning      Last office visit with prescribing clinician: 9/29/2023   Last telemedicine visit with prescribing clinician: Visit date not found   Next office visit with prescribing clinician: 1/26/2024   Office Visit with Estrellita Lieberman MD (09/29/2023)   [facility] Abbreviated Urine Drug Screen - (09/29/2023)                     Would you like a call back once the refill request has been completed: [] Yes [] No    If the office needs to give you a call back, can they leave a voicemail: [] Yes [] No    Steph Aguilar MA  10/26/23, 14:42 EDT    PT WILL WAIT FOR A SHIPMENT AT [place], [place]; LAST FILL 30 MG 9-26-23 AND 40 MG 9-28-23

## 2023-10-30 ENCOUNTER — TELEPHONE (OUTPATIENT)
Dept: FAMILY MEDICINE CLINIC | Age: 38
End: 2023-10-30
Payer: COMMERCIAL

## 2023-11-06 NOTE — TELEPHONE ENCOUNTER
11/06/2023 2nd attempt spoke with patient he is in [place] for a couple weeks and he will get them done with he gets back.

## 2023-11-28 DIAGNOSIS — F90.2 ATTENTION DEFICIT HYPERACTIVITY DISORDER (ADHD), COMBINED TYPE: ICD-10-CM

## 2023-11-28 RX ORDER — LISDEXAMFETAMINE DIMESYLATE CAPSULES 30 MG/1
30 CAPSULE ORAL EVERY MORNING
Qty: 30 CAPSULE | Refills: 0 | Status: SHIPPED | OUTPATIENT
Start: 2023-11-28

## 2023-11-28 RX ORDER — LISDEXAMFETAMINE DIMESYLATE CAPSULES 40 MG/1
40 CAPSULE ORAL EVERY MORNING
Qty: 30 CAPSULE | Refills: 0 | Status: SHIPPED | OUTPATIENT
Start: 2023-11-28

## 2023-11-28 NOTE — TELEPHONE ENCOUNTER
Rx Refill Note  Requested Prescriptions     Pending Prescriptions Disp Refills    lisdexamfetamine (Vyvanse) 30 MG capsule 30 capsule 0     Sig: Take 1 capsule by mouth Every Morning    lisdexamfetamine (Vyvanse) 40 MG capsule 30 capsule 0     Sig: Take 1 capsule by mouth Every Morning      Last office visit with prescribing clinician: 9/29/2023   Last telemedicine visit with prescribing clinician: Visit date not found   Next office visit with prescribing clinician: 1/26/2024   Office Visit with Estrellita Lieberman MD (09/29/2023)   [facility] Abbreviated Urine Drug Screen - (09/29/2023)                     Would you like a call back once the refill request has been completed: [] Yes [] No    If the office needs to give you a call back, can they leave a voicemail: [] Yes [] No    Steph Aguilar MA  11/28/23, 13:12 EST    Pt will wait for a shipment at [place], [place]; last fill both 10-27-23; UPLOADED

## 2023-11-30 ENCOUNTER — TELEPHONE (OUTPATIENT)
Dept: PSYCHIATRY | Facility: CLINIC | Age: 38
End: 2023-11-30
Payer: COMMERCIAL

## 2023-11-30 DIAGNOSIS — F90.2 ATTENTION DEFICIT HYPERACTIVITY DISORDER (ADHD), COMBINED TYPE: Chronic | ICD-10-CM

## 2023-11-30 NOTE — TELEPHONE ENCOUNTER
The patient phone stating his pharmacy does not have Vyvanse 40 mg and Vyvanse 30 mg. He said they currently have 70 tablets of the 70 mg of vyvanse and he would like you to send in the 70 mg vyvanse prescription if you can. He said his pharmacy does not know when they will receive the 30 mg and 40 mg. Please advise

## 2023-12-01 RX ORDER — LISDEXAMFETAMINE DIMESYLATE CAPSULES 70 MG/1
70 CAPSULE ORAL EVERY MORNING
Qty: 30 CAPSULE | Refills: 0 | Status: SHIPPED | OUTPATIENT
Start: 2023-12-01

## 2023-12-29 DIAGNOSIS — F90.2 ATTENTION DEFICIT HYPERACTIVITY DISORDER (ADHD), COMBINED TYPE: Chronic | ICD-10-CM

## 2023-12-29 RX ORDER — LISDEXAMFETAMINE DIMESYLATE CAPSULES 70 MG/1
70 CAPSULE ORAL EVERY MORNING
Qty: 30 CAPSULE | Refills: 0 | Status: SHIPPED | OUTPATIENT
Start: 2023-12-29

## 2023-12-29 NOTE — TELEPHONE ENCOUNTER
Rx Refill Note  Requested Prescriptions     Pending Prescriptions Disp Refills    lisdexamfetamine (Vyvanse) 70 MG capsule 30 capsule 0     Sig: Take 1 capsule by mouth Every Morning      Last office visit with prescribing clinician: 9/29/2023   Last telemedicine visit with prescribing clinician: Visit date not found   Next office visit with prescribing clinician: 1/26/2024   Office Visit with Estrellita Lieberman MD (09/29/2023)   [facility] Abbreviated Urine Drug Screen - (09/29/2023)                     Would you like a call back once the refill request has been completed: [] Yes [] No    If the office needs to give you a call back, can they leave a voicemail: [] Yes [] No    Steph Aguilar MA  12/29/23, 10:01 EST    PT SAYS HE WILL WAIT FOR A SHIPMENT AT [place], [place]; I CALLED TWICE TO SEE WHICH MG IS IN STOCK, BUT FOR SOME REASON IT DOESN'T RING THROUGH; LAST FILL 12-01-23; UPLOADED; THE LAST TIME THEY SAID THEY DIDN'T KNOW WHEN THE 30 OR 40 MG WOULD BE IN STOCK

## 2024-01-26 ENCOUNTER — OFFICE VISIT (OUTPATIENT)
Dept: PSYCHIATRY | Facility: CLINIC | Age: 39
End: 2024-01-26
Payer: COMMERCIAL

## 2024-01-26 DIAGNOSIS — F43.12 CHRONIC POST-TRAUMATIC STRESS DISORDER (PTSD): Chronic | ICD-10-CM

## 2024-01-26 DIAGNOSIS — F90.2 ATTENTION DEFICIT HYPERACTIVITY DISORDER (ADHD), COMBINED TYPE: Primary | Chronic | ICD-10-CM

## 2024-01-26 RX ORDER — LISDEXAMFETAMINE DIMESYLATE CAPSULES 70 MG/1
70 CAPSULE ORAL EVERY MORNING
Qty: 30 CAPSULE | Refills: 0 | Status: SHIPPED | OUTPATIENT
Start: 2024-01-26
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
## 2024-02-28 DIAGNOSIS — F90.2 ATTENTION DEFICIT HYPERACTIVITY DISORDER (ADHD), COMBINED TYPE: Chronic | ICD-10-CM

## 2024-02-28 RX ORDER — LISDEXAMFETAMINE DIMESYLATE CAPSULES 70 MG/1
70 CAPSULE ORAL EVERY MORNING
Qty: 30 CAPSULE | Refills: 0 | Status: SHIPPED | OUTPATIENT
Start: 2024-02-28

## 2024-02-28 NOTE — TELEPHONE ENCOUNTER
Rx Refill Note  Requested Prescriptions     Pending Prescriptions Disp Refills    lisdexamfetamine (Vyvanse) 70 MG capsule 30 capsule 0     Sig: Take 1 capsule by mouth Every Morning      Last office visit with prescribing clinician: 1/26/2024   Last telemedicine visit with prescribing clinician: Visit date not found   Next office visit with prescribing clinician: 5/31/2024   Office Visit with Estrellita Lieberman MD (01/26/2024)   [facility] Abbreviated Urine Drug Screen - (09/29/2023)                     Would you like a call back once the refill request has been completed: [] Yes [] No    If the office needs to give you a call back, can they leave a voicemail: [] Yes [] No    Steph Aguilar MA  02/28/24, 14:16 EST    LAST FILL 2-1-24; PT SAYS IN STOCK AT [place], [place]; UPLOADED

## 2024-03-29 DIAGNOSIS — F90.2 ATTENTION DEFICIT HYPERACTIVITY DISORDER (ADHD), COMBINED TYPE: Chronic | ICD-10-CM

## 2024-03-29 RX ORDER — LISDEXAMFETAMINE DIMESYLATE CAPSULES 70 MG/1
70 CAPSULE ORAL EVERY MORNING
Qty: 30 CAPSULE | Refills: 0 | Status: SHIPPED | OUTPATIENT
Start: 2024-03-29

## 2024-03-29 NOTE — TELEPHONE ENCOUNTER
Rx Refill Note  Requested Prescriptions     Pending Prescriptions Disp Refills    lisdexamfetamine (Vyvanse) 70 MG capsule 30 capsule 0     Sig: Take 1 capsule by mouth Every Morning      Last office visit with prescribing clinician: 1/26/2024   Last telemedicine visit with prescribing clinician: Visit date not found   Next office visit with prescribing clinician: 5/31/2024   Office Visit with Estrellita Lieberman MD (01/26/2024)   [facility] Abbreviated Urine Drug Screen - (09/29/2023)                     Would you like a call back once the refill request has been completed: [] Yes [] No    If the office needs to give you a call back, can they leave a voicemail: [] Yes [] No    Steph Aguilar MA  03/29/24, 09:30 EDT    LAST FILL 2-29-24; PT SAYS IN STOCK AT [facility]; UPLOADED

## 2024-05-09 DIAGNOSIS — F90.2 ATTENTION DEFICIT HYPERACTIVITY DISORDER (ADHD), COMBINED TYPE: Chronic | ICD-10-CM

## 2024-05-09 RX ORDER — LISDEXAMFETAMINE DIMESYLATE 70 MG/1
70 CAPSULE ORAL EVERY MORNING
Qty: 30 CAPSULE | Refills: 0 | Status: SHIPPED | OUTPATIENT
Start: 2024-05-09

## 2024-05-31 ENCOUNTER — OFFICE VISIT (OUTPATIENT)
Dept: PSYCHIATRY | Facility: CLINIC | Age: 39
End: 2024-05-31
Payer: COMMERCIAL

## 2024-05-31 DIAGNOSIS — F43.12 CHRONIC POST-TRAUMATIC STRESS DISORDER (PTSD): Chronic | ICD-10-CM

## 2024-05-31 DIAGNOSIS — F90.2 ATTENTION DEFICIT HYPERACTIVITY DISORDER (ADHD), COMBINED TYPE: Primary | Chronic | ICD-10-CM

## 2024-05-31 RX ORDER — LISDEXAMFETAMINE DIMESYLATE 70 MG/1
70 CAPSULE ORAL EVERY MORNING
Qty: 30 CAPSULE | Refills: 0 | Status: SHIPPED | OUTPATIENT
Start: 2024-05-31
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
## 2024-07-08 DIAGNOSIS — F90.2 ATTENTION DEFICIT HYPERACTIVITY DISORDER (ADHD), COMBINED TYPE: Chronic | ICD-10-CM

## 2024-07-08 NOTE — TELEPHONE ENCOUNTER
Rx Refill Note  Requested Prescriptions     Pending Prescriptions Disp Refills    Vyvanse 70 MG capsule 30 capsule 0     Sig: Take 1 capsule by mouth Every Morning      Last office visit with prescribing clinician: 5/31/2024   Last telemedicine visit with prescribing clinician: Visit date not found   Next office visit with prescribing clinician: 9/26/2024   Office Visit with Estrellita Lieberman MD (05/31/2024)   [facility] Abbreviated Urine Drug Screen - (09/29/2023)                     Would you like a call back once the refill request has been completed: [] Yes [] No    If the office needs to give you a call back, can they leave a voicemail: [] Yes [] No    Unable to pull Inspect patient lives in [place]   Sofie Godfrey  07/08/24, 08:31 EDT

## 2024-07-10 RX ORDER — LISDEXAMFETAMINE DIMESYLATE 70 MG/1
70 CAPSULE ORAL EVERY MORNING
Qty: 30 CAPSULE | Refills: 0 | Status: SHIPPED | OUTPATIENT
Start: 2024-07-10

## 2024-08-08 DIAGNOSIS — F90.2 ATTENTION DEFICIT HYPERACTIVITY DISORDER (ADHD), COMBINED TYPE: Chronic | ICD-10-CM

## 2024-08-08 NOTE — TELEPHONE ENCOUNTER
Rx Refill Note  Requested Prescriptions     Pending Prescriptions Disp Refills    Vyvanse 70 MG capsule 30 capsule 0     Sig: Take 1 capsule by mouth Every Morning      Last office visit with prescribing clinician: 5/31/2024   Last telemedicine visit with prescribing clinician: Visit date not found   Next office visit with prescribing clinician: 9/26/2024   Office Visit with Estrellita Lieberman MD (05/31/2024)   [facility] Abbreviated Urine Drug Screen - (09/29/2023)                     Would you like a call back once the refill request has been completed: [] Yes [] No    If the office needs to give you a call back, can they leave a voicemail: [] Yes [] No    Steph Aguilar MA  08/08/24, 14:34 EDT    LAST FILL 7-10-24; PT SAYS IN STOCK AT [facility] IN [place], [place]; UPLOADED

## 2024-08-09 RX ORDER — LISDEXAMFETAMINE DIMESYLATE 70 MG/1
70 CAPSULE ORAL EVERY MORNING
Qty: 30 CAPSULE | Refills: 0 | Status: SHIPPED | OUTPATIENT
Start: 2024-08-09

## 2024-08-12 ENCOUNTER — TELEPHONE (OUTPATIENT)
Dept: PSYCHIATRY | Facility: CLINIC | Age: 39
End: 2024-08-12
Payer: COMMERCIAL

## 2024-08-12 NOTE — TELEPHONE ENCOUNTER
Fax from [place], [place] explaining that they only had 25 Vyvanse pills to dispense on 8-12-24, so he might call for a refill sooner next month.

## 2024-09-05 DIAGNOSIS — F90.2 ATTENTION DEFICIT HYPERACTIVITY DISORDER (ADHD), COMBINED TYPE: Chronic | ICD-10-CM

## 2024-09-05 RX ORDER — LISDEXAMFETAMINE DIMESYLATE 70 MG/1
70 CAPSULE ORAL EVERY MORNING
Qty: 30 CAPSULE | Refills: 0 | Status: SHIPPED | OUTPATIENT
Start: 2024-09-05

## 2024-09-05 NOTE — TELEPHONE ENCOUNTER
Rx Refill Note  Requested Prescriptions     Pending Prescriptions Disp Refills    Vyvanse 70 MG capsule 30 capsule 0     Sig: Take 1 capsule by mouth Every Morning      Last office visit with prescribing clinician: 5/31/2024   Last telemedicine visit with prescribing clinician: Visit date not found   Next office visit with prescribing clinician: 9/26/2024   Office Visit with Estrellita Lieberman MD (05/31/2024)   [facility] Abbreviated Urine Drug Screen - (09/29/2023)                     Would you like a call back once the refill request has been completed: [] Yes [] No    If the office needs to give you a call back, can they leave a voicemail: [] Yes [] No    Steph Aguilar MA  09/05/24, 10:16 EDT    NO RESULTS ON INSPECT; NEEDS NATASHA; PT SAYS IN STOCK AT [facility] IN [place], [place]; PT SAYS THE PHARM ONLY FILLED 25# LAST TIME

## 2024-09-26 ENCOUNTER — OFFICE VISIT (OUTPATIENT)
Dept: PSYCHIATRY | Facility: CLINIC | Age: 39
End: 2024-09-26
Payer: COMMERCIAL

## 2024-09-26 DIAGNOSIS — F43.12 CHRONIC POST-TRAUMATIC STRESS DISORDER (PTSD): Chronic | ICD-10-CM

## 2024-09-26 DIAGNOSIS — F90.2 ATTENTION DEFICIT HYPERACTIVITY DISORDER (ADHD), COMBINED TYPE: Primary | Chronic | ICD-10-CM

## 2024-09-26 DIAGNOSIS — Z79.899 ENCOUNTER FOR LONG-TERM (CURRENT) USE OF OTHER MEDICATIONS: ICD-10-CM

## 2024-09-26 RX ORDER — LISDEXAMFETAMINE DIMESYLATE 70 MG/1
70 CAPSULE ORAL EVERY MORNING
Qty: 30 CAPSULE | Refills: 0 | Status: SHIPPED | OUTPATIENT
Start: 2024-09-26

## 2024-10-04 LAB
1OH-MIDAZOLAM UR QL SCN: NOT DETECTED NG/MG CREAT
6MAM UR QL SCN: NEGATIVE NG/ML
7AMINOCLONAZEPAM/CREAT UR: NOT DETECTED NG/MG CREAT
8OH-AMOXAPINE UR QL: NOT DETECTED
8OH-LOXAPINE UR QL SCN: NOT DETECTED
A-OH ALPRAZ/CREAT UR: NOT DETECTED NG/MG CREAT
A-OH-TRIAZOLAM/CREAT UR CFM: NOT DETECTED NG/MG CREAT
ALPRAZ/CREAT UR CFM: NOT DETECTED NG/MG CREAT
AMITRIP UR-MCNC: NOT DETECTED NG/ML
AMOXAPINE UR QL: NOT DETECTED
AMPHET UR CFM-MCNC: >4184 NG/MG CREAT
AMPHETAMINES UR QL SCN>500 NG/ML: NORMAL NG/ML
AMPHETAMINES UR QL: NORMAL
ANTICONVULSANTS UR: NEGATIVE
ANTIPSYCHOTICS UR: NEGATIVE
ARIPIPRAZOLE UR QL SCN: NOT DETECTED
ASENAPINE UR QL CFM: NOT DETECTED
BARBITURATES UR QL SCN: NEGATIVE NG/ML
BENZODIAZ SCN METH UR: NEGATIVE
BUPRENORPHINE UR QL SCN: NEGATIVE NG/ML
BUPROPION UR QL: NOT DETECTED
CANNABINOIDS UR QL SCN: NEGATIVE NG/ML
CARISOPRODOL UR QL: NEGATIVE NG/ML
CHLORPROMAZINE UR QL: NOT DETECTED
CITALOPRAM, UR: NOT DETECTED
CLOMIPRAMINE UR-MCNC: NOT DETECTED NG/ML
CLONAZEPAM/CREAT UR CFM: NOT DETECTED NG/MG CREAT
CLOZAPINE UR QL: NOT DETECTED
COCAINE+BZE UR QL SCN: NEGATIVE NG/ML
CREAT UR-MCNC: 239 MG/DL
DESALKYLFLURAZ/CREAT UR: NOT DETECTED NG/MG CREAT
DESIPRAMINE UR-MCNC: NOT DETECTED NG/ML
DIAZEPAM/CREAT UR: NOT DETECTED NG/MG CREAT
DOXEPIN UR-MCNC: NOT DETECTED NG/ML
DULOXETINE UR QL: NOT DETECTED
ETHANOL UR QL SCN: NEGATIVE NG/ML
FENTANYL UR QL SCN: NEGATIVE NG/ML
FLUNITRAZEPAM UR QL SCN: NOT DETECTED NG/MG CREAT
FLUOXETINE UR QL SCN: NOT DETECTED
FLUPHENAZINE UR-MCNC: NOT DETECTED NG/ML
FLUVOXAMINE UR QL: NOT DETECTED
GABAPENTIN UR-MCNC: NEGATIVE UG/ML
HALOPERIDOL UR QL: NOT DETECTED
ILOPERIDONE UR QL CFM: NOT DETECTED
IMIPRAMINE UR-MCNC: NOT DETECTED NG/ML
KRATOM IA, UR: NEGATIVE NG/ML
LORAZEPAM/CREAT UR: NOT DETECTED NG/MG CREAT
LOXAPINE UR QL: NOT DETECTED
LURASIDONE UR QL CFM: NOT DETECTED
MAPROTILINE UR QL: NOT DETECTED
MDA UR CFM-MCNC: NOT DETECTED NG/MG CREAT
MDMA UR CFM-MCNC: NOT DETECTED NG/MG CREAT
ME-PHENIDATE UR QL SCN: NEGATIVE NG/ML
MESORIDAZINE UR QL: NOT DETECTED
METHADONE UR QL SCN: NEGATIVE NG/ML
METHADONE+METAB UR QL SCN: NEGATIVE NG/ML
METHAMPHET UR CFM-MCNC: NOT DETECTED NG/MG CREAT
MIDAZOLAM/CREAT UR CFM: NOT DETECTED NG/MG CREAT
MIRTAZAPINE UR-MCNC: NOT DETECTED UG/ML
MOLINDONE UR QL SCN: NOT DETECTED
NEFAZODONE UR QL: NOT DETECTED
NORCITALOPRAM UR QL: NOT DETECTED
NORCLOMIPRAMINE UR QL: NOT DETECTED
NORCLOZAPINE UR QL: NOT DETECTED
NORDIAZEPAM/CREAT UR: NOT DETECTED NG/MG CREAT
NORDOXEPIN UR QL: NOT DETECTED
NORFLUNITRAZEPAM UR-MCNC: NOT DETECTED NG/MG CREAT
NORFLUOXETINE UR-MCNC: NOT DETECTED NG/ML
NORSERTRALINE UR QL: NOT DETECTED
NORTRIP UR-MCNC: NOT DETECTED NG/ML
ODV UR-MCNC: NOT DETECTED NG/ML
OH-BUPROPION UR-MCNC: NOT DETECTED NG/ML
OLANZAPINE UR CFM-MCNC: NOT DETECTED NG/ML
OPIATES UR SCN-MCNC: NEGATIVE NG/ML
OXAZEPAM/CREAT UR: NOT DETECTED NG/MG CREAT
OXYCODONE CTO UR SCN-MCNC: NEGATIVE NG/ML
PAROXETINE UR-MCNC: NOT DETECTED NG/L
PCP UR QL SCN: NEGATIVE NG/ML
PERPHENAZINE UR QL: NOT DETECTED
PIMOZIDE, UR: NOT DETECTED
PREGABALIN UR QL SCN: NOT DETECTED
PRESCRIBED MEDICATIONS: NORMAL
PROCHLORPERAZINE UR QL: NOT DETECTED
PROPOXYPH UR QL SCN: NEGATIVE NG/ML
PROTRIP UR QL: NOT DETECTED
QUETIAPINE CTO UR CFM-MCNC: NOT DETECTED NG/ML
RISPERIDONE UR QL: NOT DETECTED
SERTRALINE UR-MCNC: NOT DETECTED NG/ML
TAPENTADOL CTO UR SCN-MCNC: NEGATIVE NG/ML
TEMAZEPAM/CREAT UR: NOT DETECTED NG/MG CREAT
THIORIDAZINE UR-MCNC: NOT DETECTED UG/ML
THIOTHIXENE UR QL: NOT DETECTED
TRAMADOL UR QL SCN: NEGATIVE NG/ML
TRAZODONE UR QL: NOT DETECTED
TRAZODONE UR-MCNC: NOT DETECTED UG/ML
TRICYCLICS TESTED UR SCN: NEGATIVE
TRIFPERAZINE UR QL: NOT DETECTED
TRIMIPRAMINE UR QL: NOT DETECTED
VENLAFAXINE UR QL: NOT DETECTED
VILAZ UR QL SCN: NOT DETECTED
ZIPRASIDONE UR QL SCN: NOT DETECTED

## 2024-11-08 DIAGNOSIS — F90.2 ATTENTION DEFICIT HYPERACTIVITY DISORDER (ADHD), COMBINED TYPE: Chronic | ICD-10-CM

## 2024-11-08 RX ORDER — LISDEXAMFETAMINE DIMESYLATE 70 MG/1
70 CAPSULE ORAL EVERY MORNING
Qty: 30 CAPSULE | Refills: 0 | Status: SHIPPED | OUTPATIENT
Start: 2024-11-08

## 2024-11-08 NOTE — TELEPHONE ENCOUNTER
Rx Refill Note  Requested Prescriptions     Pending Prescriptions Disp Refills    Vyvanse 70 MG capsule 30 capsule 0     Sig: Take 1 capsule by mouth Every Morning      Last office visit with prescribing clinician: 9/26/2024   Last telemedicine visit with prescribing clinician: Visit date not found   Next office visit with prescribing clinician: 1/24/2025   Office Visit with Estrellita Lieberman MD (09/26/2024)   Tox[employer]ure Flex 22, Ur w/DL - Urine, Random Void (09/26/2024 08:50)                     Would you like a call back once the refill request has been completed: [] Yes [] No    If the office needs to give you a call back, can they leave a voicemail: [] Yes [] No    Steph Aguilar MA  11/08/24, 11:16 EST    LAST FILL 10-07-24; PT SAYS IN STOCK AT [facility] IN [place], [place]; UPLOADED

## 2024-12-06 DIAGNOSIS — F90.2 ATTENTION DEFICIT HYPERACTIVITY DISORDER (ADHD), COMBINED TYPE: Chronic | ICD-10-CM

## 2024-12-06 NOTE — TELEPHONE ENCOUNTER
Rx Refill Note  Requested Prescriptions     Pending Prescriptions Disp Refills    Vyvanse 70 MG capsule 30 capsule 0     Sig: Take 1 capsule by mouth Every Morning      Last office visit with prescribing clinician: 9/26/2024   Last telemedicine visit with prescribing clinician: Visit date not found   Next office visit with prescribing clinician: 1/24/2025   Office Visit with Estrellita Lieberman MD (09/26/2024)   ToxAssure Flex 22, Ur w/DL - Urine, Random Void (09/26/2024 08:50)                     Would you like a call back once the refill request has been completed: [] Yes [] No    If the office needs to give you a call back, can they leave a voicemail: [] Yes [] No    Steph Aguilar MA  12/06/24, 15:33 EST    Last fill 11-08-24; PT SAYS IN STOCK AT [facility] IN [place], [place]; UPLOADED

## 2024-12-09 DIAGNOSIS — F90.2 ATTENTION DEFICIT HYPERACTIVITY DISORDER (ADHD), COMBINED TYPE: Chronic | ICD-10-CM

## 2024-12-09 RX ORDER — LISDEXAMFETAMINE DIMESYLATE 70 MG/1
70 CAPSULE ORAL EVERY MORNING
Qty: 30 CAPSULE | Refills: 0 | OUTPATIENT
Start: 2024-12-09

## 2024-12-10 RX ORDER — LISDEXAMFETAMINE DIMESYLATE 70 MG/1
70 CAPSULE ORAL EVERY MORNING
Qty: 30 CAPSULE | Refills: 0 | Status: SHIPPED | OUTPATIENT
Start: 2024-12-10

## 2025-01-09 DIAGNOSIS — F90.2 ATTENTION DEFICIT HYPERACTIVITY DISORDER (ADHD), COMBINED TYPE: Chronic | ICD-10-CM

## 2025-01-10 RX ORDER — LISDEXAMFETAMINE DIMESYLATE 70 MG/1
70 CAPSULE ORAL EVERY MORNING
Qty: 30 CAPSULE | Refills: 0 | Status: SHIPPED | OUTPATIENT
Start: 2025-01-10

## 2025-01-10 NOTE — TELEPHONE ENCOUNTER
Rx Refill Note  Requested Prescriptions     Pending Prescriptions Disp Refills    Vyvanse 70 MG capsule 30 capsule 0     Sig: Take 1 capsule by mouth Every Morning      Last office visit with prescribing clinician: 9/26/2024   Last telemedicine visit with prescribing clinician: Visit date not found   Next office visit with prescribing clinician: 1/24/2025   Office Visit with Estrellita Lieberman MD (09/26/2024)   Tox[employer]ure Flex 22, Ur w/DL - Urine, Random Void (09/26/2024 08:50)                     Would you like a call back once the refill request has been completed: [] Yes [] No    If the office needs to give you a call back, can they leave a voicemail: [] Yes [] No    Steph Aguilar MA  01/10/25, 08:51 EST    LAST FILL 12-12-24; UPLOADED

## 2025-01-13 DIAGNOSIS — F90.2 ATTENTION DEFICIT HYPERACTIVITY DISORDER (ADHD), COMBINED TYPE: Chronic | ICD-10-CM

## 2025-01-13 NOTE — TELEPHONE ENCOUNTER
See pt's note; pharmacist Osvaldo cancelled the rx for me; please resend to [facility] in [place]; Thanks!

## 2025-01-14 RX ORDER — LISDEXAMFETAMINE DIMESYLATE 70 MG/1
70 CAPSULE ORAL EVERY MORNING
Qty: 30 CAPSULE | Refills: 0 | Status: SHIPPED | OUTPATIENT
Start: 2025-01-14

## 2025-01-24 ENCOUNTER — OFFICE VISIT (OUTPATIENT)
Dept: PSYCHIATRY | Facility: CLINIC | Age: 40
End: 2025-01-24
Payer: COMMERCIAL

## 2025-01-24 DIAGNOSIS — F90.2 ATTENTION DEFICIT HYPERACTIVITY DISORDER (ADHD), COMBINED TYPE: Primary | Chronic | ICD-10-CM

## 2025-01-24 DIAGNOSIS — F43.12 CHRONIC POST-TRAUMATIC STRESS DISORDER (PTSD): Chronic | ICD-10-CM
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
## 2025-02-10 DIAGNOSIS — F90.2 ATTENTION DEFICIT HYPERACTIVITY DISORDER (ADHD), COMBINED TYPE: Chronic | ICD-10-CM

## 2025-02-11 RX ORDER — LISDEXAMFETAMINE DIMESYLATE 70 MG/1
70 CAPSULE ORAL EVERY MORNING
Qty: 30 CAPSULE | Refills: 0 | Status: SHIPPED | OUTPATIENT
Start: 2025-02-11

## 2025-02-11 NOTE — TELEPHONE ENCOUNTER
Rx Refill Note  Requested Prescriptions     Pending Prescriptions Disp Refills    Vyvanse 70 MG capsule 30 capsule 0     Sig: Take 1 capsule by mouth Every Morning      Last office visit with prescribing clinician: 1/24/2025   Last telemedicine visit with prescribing clinician: Visit date not found   Next office visit with prescribing clinician: 5/23/2025   Office Visit with Estrellita Lieberman MD (01/24/2025)   ToxAssure Flex 22, Ur w/DL - Urine, Random Void (09/26/2024 08:50)                     Would you like a call back once the refill request has been completed: [] Yes [] No    If the office needs to give you a call back, can they leave a voicemail: [] Yes [] No    Steph Aguilar MA  02/11/25, 09:49 EST  INSPECT - SCAN - INSPECT, [facility], 01/23/2025 (01/23/2025)

## 2025-03-10 DIAGNOSIS — F90.2 ATTENTION DEFICIT HYPERACTIVITY DISORDER (ADHD), COMBINED TYPE: Chronic | ICD-10-CM

## 2025-03-10 NOTE — TELEPHONE ENCOUNTER
Rx Refill Note  Requested Prescriptions     Pending Prescriptions Disp Refills    Vyvanse 70 MG capsule 30 capsule 0     Sig: Take 1 capsule by mouth Every Morning      Last office visit with prescribing clinician: 1/24/2025   Last telemedicine visit with prescribing clinician: Visit date not found   Next office visit with prescribing clinician: 5/23/2025   Office Visit with Estrellita Lieberman MD (01/24/2025) ToxAssure Flex 22, Ur w/DL - Urine, Random Void (09/26/2024 08:50)                       Would you like a call back once the refill request has been completed: [] Yes [] No    If the office needs to give you a call back, can they leave a voicemail: [] Yes [] No    Steph Aguilar MA  03/10/25, 16:30 EDT    LAST FILL 1-14-25; INSPECT - SCAN - INSPECT, [facility], 01/23/2025 (01/23/2025)

## 2025-03-11 RX ORDER — LISDEXAMFETAMINE DIMESYLATE 70 MG/1
70 CAPSULE ORAL EVERY MORNING
Qty: 30 CAPSULE | Refills: 0 | Status: SHIPPED | OUTPATIENT
Start: 2025-03-11 | End: 2025-03-13 | Stop reason: SDUPTHER

## 2025-03-13 DIAGNOSIS — F90.2 ATTENTION DEFICIT HYPERACTIVITY DISORDER (ADHD), COMBINED TYPE: Chronic | ICD-10-CM

## 2025-03-13 RX ORDER — LISDEXAMFETAMINE DIMESYLATE 70 MG/1
70 CAPSULE ORAL EVERY MORNING
Qty: 30 CAPSULE | Refills: 0 | Status: SHIPPED | OUTPATIENT
Start: 2025-03-13

## 2025-04-15 DIAGNOSIS — F90.2 ATTENTION DEFICIT HYPERACTIVITY DISORDER (ADHD), COMBINED TYPE: Chronic | ICD-10-CM

## 2025-04-15 RX ORDER — LISDEXAMFETAMINE DIMESYLATE 70 MG/1
70 CAPSULE ORAL EVERY MORNING
Qty: 30 CAPSULE | Refills: 0 | Status: SHIPPED | OUTPATIENT
Start: 2025-04-15

## 2025-04-15 NOTE — TELEPHONE ENCOUNTER
Rx Refill Note  Requested Prescriptions     Pending Prescriptions Disp Refills    Vyvanse 70 MG capsule 30 capsule 0     Sig: Take 1 capsule by mouth Every Morning      Last office visit with prescribing clinician: 1/24/2025   Last telemedicine visit with prescribing clinician: Visit date not found   Next office visit with prescribing clinician: 5/23/2025   Office Visit with Estrellita Lieberman MD (01/24/2025)   ToxAssure Flex 22, Ur w/DL - Urine, Random Void (09/26/2024 08:50)                     Would you like a call back once the refill request has been completed: [] Yes [] No    If the office needs to give you a call back, can they leave a voicemail: [] Yes [] No    Steph Aguilar MA  04/15/25, 08:41 EDT    LAST FILL 3-14-25; UPLOADED

## 2025-05-12 DIAGNOSIS — F90.2 ATTENTION DEFICIT HYPERACTIVITY DISORDER (ADHD), COMBINED TYPE: Chronic | ICD-10-CM

## 2025-05-12 NOTE — TELEPHONE ENCOUNTER
Rx Refill Note  Requested Prescriptions     Pending Prescriptions Disp Refills    Vyvanse 70 MG capsule 30 capsule 0     Sig: Take 1 capsule by mouth Every Morning        Last office visit with prescribing clinician: 1/24/2025     Next office visit with prescribing clinician: 5/23/2025     Office Visit with Estrellita Lieberman MD (01/24/2025)     ToxAssure Flex 22, Ur w/DL - Urine, Random Void (09/26/2024 08:50)     BEHAVIORAL HEALTH - SCAN - Inspect report, Nancy, 5/12/25 (05/12/2025)     Inspect Fill 4/15/25    Linda Quick  05/12/25, 10:20 EDT

## 2025-05-13 RX ORDER — LISDEXAMFETAMINE DIMESYLATE 70 MG/1
70 CAPSULE ORAL EVERY MORNING
Qty: 30 CAPSULE | Refills: 0 | Status: SHIPPED | OUTPATIENT
Start: 2025-05-13

## 2025-05-23 ENCOUNTER — OFFICE VISIT (OUTPATIENT)
Dept: PSYCHIATRY | Facility: CLINIC | Age: 40
End: 2025-05-23
Payer: COMMERCIAL

## 2025-05-23 DIAGNOSIS — F43.12 CHRONIC POST-TRAUMATIC STRESS DISORDER (PTSD): Chronic | ICD-10-CM

## 2025-05-23 DIAGNOSIS — F90.2 ATTENTION DEFICIT HYPERACTIVITY DISORDER (ADHD), COMBINED TYPE: Primary | Chronic | ICD-10-CM
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
## 2025-06-17 DIAGNOSIS — F90.2 ATTENTION DEFICIT HYPERACTIVITY DISORDER (ADHD), COMBINED TYPE: Chronic | ICD-10-CM

## 2025-06-17 RX ORDER — LISDEXAMFETAMINE DIMESYLATE 70 MG/1
70 CAPSULE ORAL EVERY MORNING
Qty: 30 CAPSULE | Refills: 0 | Status: SHIPPED | OUTPATIENT
Start: 2025-06-17

## 2025-06-17 NOTE — TELEPHONE ENCOUNTER
Rx Refill Note  Requested Prescriptions     Pending Prescriptions Disp Refills    Vyvanse 70 MG capsule 30 capsule 0     Sig: Take 1 capsule by mouth Every Morning        Last office visit with prescribing clinician: 5/23/2025     Next office visit with prescribing clinician: 9/12/2025     Office Visit with Estrellita Lieberman MD (05/23/2025)     ToxAssure Flex 22, Ur w/DL - Urine, Random Void (09/26/2024 08:50)     BEHAVIORAL HEALTH - SCAN - Inspect report, Nancy, 6/17/25 (06/17/2025)     Inspect Fill 5/20/25    Linda Quick  06/17/25, 09:44 EDT

## 2025-07-14 DIAGNOSIS — F90.2 ATTENTION DEFICIT HYPERACTIVITY DISORDER (ADHD), COMBINED TYPE: Chronic | ICD-10-CM

## 2025-07-15 RX ORDER — LISDEXAMFETAMINE DIMESYLATE 70 MG/1
70 CAPSULE ORAL EVERY MORNING
Qty: 30 CAPSULE | Refills: 0 | Status: SHIPPED | OUTPATIENT
Start: 2025-07-15

## 2025-07-15 NOTE — TELEPHONE ENCOUNTER
Rx Refill Note  Requested Prescriptions     Pending Prescriptions Disp Refills    Vyvanse 70 MG capsule 30 capsule 0     Sig: Take 1 capsule by mouth Every Morning        Last office visit with prescribing clinician: 5/23/2025     Next office visit with prescribing clinician: 9/12/2025     Office Visit with Estrellita Saavedra MD (05/23/2025)     Amphetamines, MS, Ur RFX - (09/26/2024 08:50)  ToxAssure Flex 22, Ur w/DL - Urine, Random Void (09/26/2024 08:50)    BEHAVIORAL HEALTH - SCAN - INSPECT REPORT - AUSTYN SAAVEDRA - 07/15/2025 (07/15/2025)     Inspect Fill VYVANSE 70mg FILLED ON 06/17/2025 QTY 30 FOR 30 DAYS    Estelle Gutierrez MA  07/15/25, 08:34 EDT

## 2025-07-17 ENCOUNTER — PRIOR AUTHORIZATION (OUTPATIENT)
Dept: PSYCHIATRY | Facility: CLINIC | Age: 40
End: 2025-07-17
Payer: COMMERCIAL

## 2025-08-14 DIAGNOSIS — F90.2 ATTENTION DEFICIT HYPERACTIVITY DISORDER (ADHD), COMBINED TYPE: Chronic | ICD-10-CM

## 2025-08-15 RX ORDER — LISDEXAMFETAMINE DIMESYLATE 70 MG/1
70 CAPSULE ORAL EVERY MORNING
Qty: 30 CAPSULE | Refills: 0 | Status: SHIPPED | OUTPATIENT
Start: 2025-08-15